# Patient Record
Sex: FEMALE | Race: WHITE | NOT HISPANIC OR LATINO | Employment: OTHER | URBAN - METROPOLITAN AREA
[De-identification: names, ages, dates, MRNs, and addresses within clinical notes are randomized per-mention and may not be internally consistent; named-entity substitution may affect disease eponyms.]

---

## 2017-02-15 NOTE — NURSING
Total Joint Replacement Questionnaire     Layla Griffiths participated in Joint Class Feb 9.    1. Have you ever had a Joint Replacement?   [x]Yes  [] No    2. How many stairs/steps do you have to enter your home? 2  When going up, on what side is the railing?  [] Left  [] Right  [] Bilateral  [x] None    3. Do you own any Durable Medical Equipment?   [x] Rolling Walker  [x] Standard Walker  [x] Cane  [] Crutches  [] Bed Side Commode  [] Hip Kit  [] Tub Transfer Bench  [x] Shower Chair    4. Have you used a Home Health Company before?   [x] Yes  [] No  If Yes, Name of Company: ? Would you like to use this company again?   [] Yes  [x] No    5. Have you arranged for someone to help you, for at least for 3 days, when you are discharged from the hospital?  [x] Yes  [] No  If Yes, Name(s) of person assisting: Saulo Griffiths and Sharon Huber  2/15/2017

## 2017-02-20 ENCOUNTER — ANESTHESIA EVENT (OUTPATIENT)
Dept: SURGERY | Facility: OTHER | Age: 72
DRG: 467 | End: 2017-02-20
Payer: MEDICARE

## 2017-02-20 ENCOUNTER — HOSPITAL ENCOUNTER (OUTPATIENT)
Dept: PREADMISSION TESTING | Facility: OTHER | Age: 72
Discharge: HOME | End: 2017-02-20
Attending: ORTHOPAEDIC SURGERY
Payer: MEDICARE

## 2017-02-20 VITALS
WEIGHT: 125 LBS | BODY MASS INDEX: 22.15 KG/M2 | HEIGHT: 63 IN | DIASTOLIC BLOOD PRESSURE: 71 MMHG | SYSTOLIC BLOOD PRESSURE: 156 MMHG | HEART RATE: 69 BPM | OXYGEN SATURATION: 98 % | TEMPERATURE: 98 F

## 2017-02-20 DIAGNOSIS — T84.498A: Primary | ICD-10-CM

## 2017-02-20 LAB
BACTERIA #/AREA URNS HPF: NORMAL /HPF
BILIRUB UR QL STRIP: NEGATIVE
CLARITY UR: CLEAR
COLOR UR: YELLOW
GLUCOSE UR QL STRIP: NEGATIVE
HGB UR QL STRIP: ABNORMAL
KETONES UR QL STRIP: NEGATIVE
LEUKOCYTE ESTERASE UR QL STRIP: NEGATIVE
MICROSCOPIC COMMENT: NORMAL
NITRITE UR QL STRIP: NEGATIVE
PH UR STRIP: 6 [PH] (ref 5–8)
PROT UR QL STRIP: NEGATIVE
RBC #/AREA URNS HPF: 3 /HPF (ref 0–4)
SP GR UR STRIP: 1.01 (ref 1–1.03)
URN SPEC COLLECT METH UR: ABNORMAL
UROBILINOGEN UR STRIP-ACNC: NEGATIVE EU/DL
WBC #/AREA URNS HPF: 2 /HPF (ref 0–5)

## 2017-02-20 PROCEDURE — 81000 URINALYSIS NONAUTO W/SCOPE: CPT

## 2017-02-20 RX ORDER — SPIRONOLACTONE AND HYDROCHLOROTHIAZIDE 25; 25 MG/1; MG/1
1 TABLET ORAL DAILY PRN
COMMUNITY

## 2017-02-20 RX ORDER — LIDOCAINE HYDROCHLORIDE 10 MG/ML
1 INJECTION, SOLUTION EPIDURAL; INFILTRATION; INTRACAUDAL; PERINEURAL ONCE
Status: CANCELLED | OUTPATIENT
Start: 2017-02-20 | End: 2017-02-20

## 2017-02-20 RX ORDER — BISACODYL 5 MG
5 TABLET, DELAYED RELEASE (ENTERIC COATED) ORAL DAILY PRN
COMMUNITY

## 2017-02-20 RX ORDER — FLUTICASONE FUROATE AND VILANTEROL 200; 25 UG/1; UG/1
1 POWDER RESPIRATORY (INHALATION) DAILY
COMMUNITY

## 2017-02-20 RX ORDER — MIDAZOLAM HYDROCHLORIDE 5 MG/ML
2 INJECTION INTRAMUSCULAR; INTRAVENOUS
Status: CANCELLED | OUTPATIENT
Start: 2017-02-20 | End: 2017-02-20

## 2017-02-20 RX ORDER — ALBUTEROL SULFATE 2.5 MG/.5ML
2.5 SOLUTION RESPIRATORY (INHALATION) EVERY 4 HOURS PRN
Status: DISCONTINUED | OUTPATIENT
Start: 2017-02-20 | End: 2017-02-21 | Stop reason: HOSPADM

## 2017-02-20 RX ORDER — PREGABALIN 75 MG/1
150 CAPSULE ORAL
Status: DISCONTINUED | OUTPATIENT
Start: 2017-02-20 | End: 2017-02-21 | Stop reason: HOSPADM

## 2017-02-20 RX ORDER — LEVOTHYROXINE SODIUM 25 UG/1
25 TABLET ORAL DAILY
COMMUNITY

## 2017-02-20 RX ORDER — SODIUM CHLORIDE, SODIUM LACTATE, POTASSIUM CHLORIDE, CALCIUM CHLORIDE 600; 310; 30; 20 MG/100ML; MG/100ML; MG/100ML; MG/100ML
INJECTION, SOLUTION INTRAVENOUS CONTINUOUS
Status: CANCELLED | OUTPATIENT
Start: 2017-02-20

## 2017-02-20 NOTE — IP AVS SNAPSHOT
[facility] Location ([facility])  [address] [place] [address]  Phone: [phone]           Patient Discharge Instructions    Our goal is to set you up for success. This packet includes information on your condition, medications, and your home care. It will help you to care for yourself so you don't get sicker.     Please ask your nurse if you have any questions.        There are many details to remember when preparing for your surgery. Here is what you will need to do, please ask your nurse if there are more specific instructions and if you have any questions:    1. 24 hours before procedure Do not smoke or drink alcoholic beverages 24 hours prior to your procedure    2. Eating before procedure Do not eat or drink anything 8 hours before your procedure - this includes gum, mints, and candy.     3. Day of procedure Please remove all jewelry for the procedure. If you wear contact lenses, dentures, hearing aids or glasses, bring a container to put them in during your surgery and give to a family member for safekeeping.  If your doctor has scheduled you for an overnight stay, bring a small overnight bag with any personal items that you need.    4. After procedure Make arrangements in advance for transportation home by a responsible adult. It is not safe to drive a vehicle during the 24 hours following surgery.     PLEASE NOTE: You may be contacted the day before your surgery to confirm your surgery date and arrival time. The Surgery schedule has many variables which may affect the time of your surgery case. Family members should be available if your surgery time changes.                ** Verify the list of medication(s) below is accurate and up to date. Carry this with you in case of emergency. If your medications have changed, please notify your healthcare provider.             Medication List      TAKE these medications        Additional Info                      amlodipine 2.5 MG tablet    Commonly known as:  NORVASC   Refills:  3    Instructions:  TK 1 T PO  QHS     Begin Date    AM    Noon    PM    Bedtime       aspirin 81 MG EC tablet   Commonly known as:  ECOTRIN   Refills:  0   Dose:  81 mg    Instructions:  Take 81 mg by mouth once daily.     Begin Date    AM    Noon    PM    Bedtime       BIOTIN ORAL   Refills:  0    Instructions:  Take by mouth.     Begin Date    AM    Noon    PM    Bedtime       bisacodyl 5 mg EC tablet   Commonly known as:  DULCOLAX   Refills:  0   Dose:  5 mg    Instructions:  Take 5 mg by mouth daily as needed for Constipation.     Begin Date    AM    Noon    PM    Bedtime       BREO ELLIPTA 200-25 mcg/dose Dsdv diskus inhaler   Refills:  0   Dose:  1 puff   Generic drug:  fluticasone-vilanterol    Instructions:  Inhale 1 puff into the lungs once daily. Controller     Begin Date    AM    Noon    PM    Bedtime       calcium citrate 1,000 mg Tab   Refills:  0   Dose:  1 tablet    Instructions:  Take 1 tablet by mouth once daily.     Begin Date    AM    Noon    PM    Bedtime       CENTRUM SILVER ORAL   Refills:  0    Instructions:  Take by mouth.     Begin Date    AM    Noon    PM    Bedtime       colesevelam 625 mg tablet   Commonly known as:  WELCHOL   Refills:  0   Dose:  1875 mg    Instructions:  Take 1,875 mg by mouth 2 (two) times daily with meals.     Begin Date    AM    Noon    PM    Bedtime       DECARA 50,000 unit capsule   Refills:  4   Generic drug:  cholecalciferol (vitamin D3)    Instructions:  TK 1 C PO Q WEEK     Begin Date    AM    Noon    PM    Bedtime       Lactobacillus rhamnosus GG 10 billion cell capsule   Commonly known as:  CULTURELLE   Refills:  0   Dose:  1 capsule    Instructions:  Take 1 capsule by mouth once daily.     Begin Date    AM    Noon    PM    Bedtime       levothyroxine 25 MCG tablet   Commonly known as:  SYNTHROID   Refills:  0   Dose:  25 mcg    Instructions:  Take 25 mcg by mouth once daily.     Begin Date    AM    Noon    PM     Bedtime       metoprolol tartrate 50 MG tablet   Commonly known as:  LOPRESSOR   Refills:  0   Dose:  1 tablet    Instructions:  Take 1 tablet by mouth Twice daily.     Begin Date    AM    Noon    PM    Bedtime       PROAIR HFA 90 mcg/actuation inhaler   Refills:  0   Generic drug:  albuterol    Instructions:  INL 1 PUFF PO Q 6 H PRN     Begin Date    AM    Noon    PM    Bedtime       ranitidine 150 MG tablet   Commonly known as:  ZANTAC   Refills:  0   Dose:  150 mg    Instructions:  Take 150 mg by mouth 2 (two) times daily.     Begin Date    AM    Noon    PM    Bedtime       spironolactone-hydrochlorothiazide 25-25mg 25-25 mg Tab   Commonly known as:  ALDACTAZIDE   Refills:  0   Dose:  1 tablet    Instructions:  Take 1 tablet by mouth daily as needed (as needed at bedtime).     Begin Date    AM    Noon    PM    Bedtime       telmisartan 80 MG Tab   Commonly known as:  MICARDIS   Refills:  3    Instructions:  TK 1 T PO QD     Begin Date    AM    Noon    PM    Bedtime       travoprost (benzalkonium) 0.004 % ophthalmic solution   Commonly known as:  TRAVATAN   Refills:  0   Dose:  1 drop    Instructions:  Place 1 drop into both eyes every evening.     Begin Date    AM    Noon    PM    Bedtime       VOLTAREN 1 % Gel   Refills:  0   Dose:  2 g   Generic drug:  diclofenac sodium    Instructions:  Apply 2 g topically once daily.     Begin Date    AM    Noon    PM    Bedtime                  Please bring to all follow up appointments:    1. A copy of your discharge instructions.  2. All medicines you are currently taking in their original bottles.  3. Identification and insurance card.    Please arrive 15 minutes ahead of scheduled appointment time.    Please call 24 hours in advance if you must reschedule your appointment and/or time.        Your Future Surgeries/Procedures     Mar 02, 2017   Surgery with David Bhandari MD   [facility] ([facility])    [address]  [place]  [address]   [phone]                  Discharge Instructions       PRE-ADMIT TESTING -  [phone]    [address]  [facility]        OUTPATIENT SURGERY UNIT - [phone]    Your surgery has been scheduled at [facility]. We are pleased to have the opportunity to serve you. For Further Information please call [phone].    On the day of surgery please report to the Information Desk on the 1st floor.    CONTACT YOUR PHYSICIAN'S OFFICE THE DAY PRIOR TO YOUR SURGERY TO OBTAIN YOUR ARRIVAL TIME.     The evening before surgery do not eat anything after 9 p.m. ( this includes hard candy, chewing gum and mints).  You may have GATORADE, POWERADE AND WATER FROM 9 p.m. until leaving home to come to the hospital.   DO NOT DRINK ANY LIQUIDS ON THE WAY TO THE HOSPITAL.     SPECIAL MEDICATION INSTRUCTIONS: TAKE medications checked off by the Anesthesiologist on your Medication List.    Angiogram Patients: Take medications as instructed by your physician, including aspirin.     Surgery Patients:    If you take ASPIRIN - Your PHYSICIAN/SURGEON will need to inform you IF/OR when you need to stop taking aspirin prior to your surgery.     Do Not take any medications containing IBUPROFEN.  Do Not Wear any make-up or dark nail polish   (especially eye make-up) to surgery. If you come to surgery with makeup on you will be required to remove the makeup or nail polish.    Do not shave your surgical area at least 5 days prior to your surgery. The surgical prep will be performed at the hospital according to Infection Control regulations.    Leave all valuables at home.   Do Not wear any jewelry or watches, including any metal in body piercings.  Contact Lens must be removed before surgery. Either do not wear the contact lens or bring a case and solution for storage.  Please bring a container for eyeglasses or dentures as required.  Bring any paperwork your physician has provided, such as consent  "forms,  history and physicals, doctor's orders, etc.   Bring comfortable clothes that are loose fitting to wear upon discharge. Take into consideration the type of surgery being performed.  Maintain your diet as advised per your physician the day prior to surgery.      Adequate rest the night before surgery is advised.   Park in the Parking lot behind the hospital or in the [facility] across the street from the parking lot. Parking is complimentary.  If you will be discharged the same day as your procedure, please arrange for a responsible adult to drive you home or to accompany you if traveling by taxi.   YOU WILL NOT BE PERMITTED TO DRIVE OR TO LEAVE THE HOSPITAL ALONE AFTER SURGERY.   It is strongly recommended that you arrange for someone to remain with you for the first 24 hrs following your surgery.       Thank you for your cooperation.  The Staff of [facility].        Bathing Instructions                                                                 Please shower the evening before and morning of your procedure with    ANTIBACTERIAL SOAP. ( DIAL, etc )  Concentrate on the surgical area   for at least 3 minutes and rinse completely. Dry off as usual.   Do not use any deodorant, powder, body lotions, perfume, after shave or    cologne.                                                Admission Information     Date & Time Provider Department CSN    2/20/2017  1:30 PM David Bhandari MD [facility] [id number]      Care Providers     Provider Role Specialty Primary office phone    David Bhandari MD Attending Provider Orthopedic Surgery [phone]      Your Vitals Were     BP Pulse Temp Height Weight       156/71 69 97.9 °F (36.6 °C) (Oral) 5' 3" (1.6 m) 56.7 kg (125 lb)     SpO2 BMI             98% 22.14 kg/m2         Recent Lab Values     No lab values to display.      Allergies as of 2/20/2017        Reactions    Percocet [Oxycodone-acetaminophen] " Palpitations    Codeine Other (See Comments)    Iodine Hives    Keflex  [Cephalexin] Itching    Methocarbamol Other (See Comments)    weakness    Diazepam Anxiety    Demerol  [Meperidine] Palpitations    Sulfa (Sulfonamide Antibiotics) Rash      [facility]     [facility] Nurse Care Line - 24/7 Assistance  Unless otherwise directed by your provider, please contact [facility], our nurse care line that is available for 24/7 assistance.     Registered nurses in the [facility] provide clinical advisement, health education, appointment booking, and other advisory services.  Call for this free service at [phone].        Advance Directives     An advance directive is a document which, in the event you are no longer able to make decisions for yourself, tells your healthcare team what kind of treatment you do or do not want to receive, or who you would like to make those decisions for you.  If you do not currently have an advance directive, [facility] encourages you to create one.  For more information call:  [phone]), [phone] ([phone]),  or log on to [email or web address].        Smoking Cessation     If you would like to quit smoking:   You may be eligible for free services if you are a [place] resident and started smoking cigarettes before September 1, 1988.  Call the Smoking Cessation Trust ([facility]) toll free at (735) 065-3542 or (777) 763-3595.   Call 8-213-QUIT-NOW if you do not meet the above criteria.            Language Assistance Services     ATTENTION: Language assistance services are available, free of charge. Please call [phone].      ATENCIÓN: Si habla español, tiene a villalpando disposición servicios gratuitos de asistencia lingüística. Llame al [phone].     CHÚ Ý: N?u b?n nói Ti?ng Vi?t, có các d?ch v? h? tr? ngôn ng? mi?n phí dành cho b?n. G?i s? [phone].        [facility] Sign-Up     Activating your [facility] account is as easy as  1-2-3!     1) Visit [email or web address], select Sign Up Now, enter this activation code and your date of birth, then select Next.  [email or web address]  Expires: 4/6/2017  2:17 PM      2) Create a username and password to use when you visit [facility] in the future and select a security question in case you lose your password and select Next.    3) Enter your e-mail address and click Sign Up!    Additional Information  If you have questions, please e-mail [email or web address] or call [phone] to talk to our [employer] staff. Remember, [facility] is NOT to be used for urgent needs. For medical emergencies, dial 911.          [facility] complies with applicable Federal civil rights laws and does not discriminate on the basis of race, color, national origin, age, disability, or sex.

## 2017-02-20 NOTE — PLAN OF CARE
02/20/17 1518   ED Admissions Case Approval   ED Admissions Case Approval (!) CM Approved  (IP )   IP CODE [id number]

## 2017-02-20 NOTE — DISCHARGE INSTRUCTIONS
PRE-ADMIT TESTING -  [phone]    [address]  [facility]        OUTPATIENT SURGERY UNIT - [phone]    Your surgery has been scheduled at [facility]. We are pleased to have the opportunity to serve you. For Further Information please call [phone].    On the day of surgery please report to the Information Desk on the 1st floor.    CONTACT YOUR PHYSICIAN'S OFFICE THE DAY PRIOR TO YOUR SURGERY TO OBTAIN YOUR ARRIVAL TIME.     The evening before surgery do not eat anything after 9 p.m. ( this includes hard candy, chewing gum and mints).  You may have GATORADE, POWERADE AND WATER FROM 9 p.m. until leaving home to come to the hospital.   DO NOT DRINK ANY LIQUIDS ON THE WAY TO THE HOSPITAL.     SPECIAL MEDICATION INSTRUCTIONS: TAKE medications checked off by the Anesthesiologist on your Medication List.    Angiogram Patients: Take medications as instructed by your physician, including aspirin.     Surgery Patients:    If you take ASPIRIN - Your PHYSICIAN/SURGEON will need to inform you IF/OR when you need to stop taking aspirin prior to your surgery.     Do Not take any medications containing IBUPROFEN.  Do Not Wear any make-up or dark nail polish   (especially eye make-up) to surgery. If you come to surgery with makeup on you will be required to remove the makeup or nail polish.    Do not shave your surgical area at least 5 days prior to your surgery. The surgical prep will be performed at the hospital according to Infection Control regulations.    Leave all valuables at home.   Do Not wear any jewelry or watches, including any metal in body piercings.  Contact Lens must be removed before surgery. Either do not wear the contact lens or bring a case and solution for storage.  Please bring a container for eyeglasses or dentures as required.  Bring any paperwork your physician has provided, such as consent forms,  history and physicals, doctor's orders, etc.   Bring comfortable  clothes that are loose fitting to wear upon discharge. Take into consideration the type of surgery being performed.  Maintain your diet as advised per your physician the day prior to surgery.      Adequate rest the night before surgery is advised.   Park in the Parking lot behind the hospital or in the [facility] across the street from the parking lot. Parking is complimentary.  If you will be discharged the same day as your procedure, please arrange for a responsible adult to drive you home or to accompany you if traveling by taxi.   YOU WILL NOT BE PERMITTED TO DRIVE OR TO LEAVE THE HOSPITAL ALONE AFTER SURGERY.   It is strongly recommended that you arrange for someone to remain with you for the first 24 hrs following your surgery.       Thank you for your cooperation.  The Staff of [facility].        Bathing Instructions                                                                 Please shower the evening before and morning of your procedure with    ANTIBACTERIAL SOAP. ( DIAL, etc )  Concentrate on the surgical area   for at least 3 minutes and rinse completely. Dry off as usual.   Do not use any deodorant, powder, body lotions, perfume, after shave or    cologne.

## 2017-02-20 NOTE — ANESTHESIA PREPROCEDURE EVALUATION
"                                                                                                             02/20/2017  Layla Griffiths is a 71 y.o., female.    [facility] Anesthesia Evaluation    I have reviewed the Patient Summary Reports.    I have reviewed the Nursing Notes.   I have reviewed the Medications.     Review of Systems  Anesthesia Hx:  No problems with previous Anesthesia  History of prior surgery of interest to airway management or planning: Previous anesthesia: General Scoliosis surg 5 yrs ago with general anesthesia.  Denies Family Hx of Anesthesia complications.   Denies Personal Hx of Anesthesia complications.   Social:  Non-Smoker, No Alcohol Use    Hematology/Oncology:  Hematology Normal   Oncology Normal     Cardiovascular:   Exercise tolerance: good Hypertension, well controlled CAD  Dysrhythmias  70% blockage in vessel "back" of heart.  Hx of rapid heart beat in past, denies afib awaiting cardiac clearance   Pulmonary:  Pulmonary Normal    Renal/:  Renal/ Normal  Microscopic hematuria   Hepatic/GI:   GERD, well controlled    Musculoskeletal:   S/p lumbar laminectomy x 2  Scoliosis surgery 2012  Cervical spine    Now pain free   Neurological:  Neurology Normal    Endocrine:  Endocrine Normal    Dermatological:  Skin Normal    Psych:  Psychiatric Normal           Physical Exam  General:  Well nourished      Dental:  Dental Findings: Lower Dentures             Anesthesia Plan  Type of Anesthesia, risks & benefits discussed:  Anesthesia Type:  general  Patient's Preference:   Intra-op Monitoring Plan:   Intra-op Monitoring Plan Comments:   Post Op Pain Control Plan:   Post Op Pain Control Plan Comments:   Induction:    Beta Blocker:         Informed Consent: Patient understands risks and agrees with Anesthesia plan.  Questions answered. Anesthesia consent signed with patient.  ASA Score: 2     Day of Surgery Review of History & Physical:    H&P update referred to the surgeon.     Anesthesia " Plan Notes: Many prev back surg for scoliosis,plan Ga no SAB,awaiting cardiac clearance        Ready For Surgery From Anesthesia Perspective.

## 2017-03-02 ENCOUNTER — HOSPITAL ENCOUNTER (INPATIENT)
Facility: OTHER | Age: 72
LOS: 1 days | Discharge: HOME HEALTH | DRG: 467 | End: 2017-03-03
Attending: ORTHOPAEDIC SURGERY | Admitting: ORTHOPAEDIC SURGERY
Payer: MEDICARE

## 2017-03-02 ENCOUNTER — ANESTHESIA (OUTPATIENT)
Dept: SURGERY | Facility: OTHER | Age: 72
DRG: 467 | End: 2017-03-02
Payer: MEDICARE

## 2017-03-02 DIAGNOSIS — T84.498A: Primary | ICD-10-CM

## 2017-03-02 PROCEDURE — 36000711: Performed by: ORTHOPAEDIC SURGERY

## 2017-03-02 PROCEDURE — 97116 GAIT TRAINING THERAPY: CPT

## 2017-03-02 PROCEDURE — 97162 PT EVAL MOD COMPLEX 30 MIN: CPT

## 2017-03-02 PROCEDURE — 25000003 PHARM REV CODE 250: Performed by: NURSE ANESTHETIST, CERTIFIED REGISTERED

## 2017-03-02 PROCEDURE — 87075 CULTR BACTERIA EXCEPT BLOOD: CPT

## 2017-03-02 PROCEDURE — C1776 JOINT DEVICE (IMPLANTABLE): HCPCS | Performed by: ORTHOPAEDIC SURGERY

## 2017-03-02 PROCEDURE — 71000033 HC RECOVERY, INTIAL HOUR: Performed by: ORTHOPAEDIC SURGERY

## 2017-03-02 PROCEDURE — 25000003 PHARM REV CODE 250: Performed by: INTERNAL MEDICINE

## 2017-03-02 PROCEDURE — 63600175 PHARM REV CODE 636 W HCPCS: Performed by: SPECIALIST

## 2017-03-02 PROCEDURE — 25000003 PHARM REV CODE 250: Performed by: ORTHOPAEDIC SURGERY

## 2017-03-02 PROCEDURE — 94640 AIRWAY INHALATION TREATMENT: CPT

## 2017-03-02 PROCEDURE — 0SR904Z REPLACEMENT OF RIGHT HIP JOINT WITH CERAMIC ON POLYETHYLENE SYNTHETIC SUBSTITUTE, OPEN APPROACH: ICD-10-PCS | Performed by: ORTHOPAEDIC SURGERY

## 2017-03-02 PROCEDURE — 11000001 HC ACUTE MED/SURG PRIVATE ROOM

## 2017-03-02 PROCEDURE — 88300 SURGICAL PATH GROSS: CPT | Mod: 26,,, | Performed by: PATHOLOGY

## 2017-03-02 PROCEDURE — 25000242 PHARM REV CODE 250 ALT 637 W/ HCPCS: Performed by: ORTHOPAEDIC SURGERY

## 2017-03-02 PROCEDURE — 97110 THERAPEUTIC EXERCISES: CPT

## 2017-03-02 PROCEDURE — 27000221 HC OXYGEN, UP TO 24 HOURS

## 2017-03-02 PROCEDURE — 94799 UNLISTED PULMONARY SVC/PX: CPT

## 2017-03-02 PROCEDURE — 63600175 PHARM REV CODE 636 W HCPCS: Performed by: NURSE ANESTHETIST, CERTIFIED REGISTERED

## 2017-03-02 PROCEDURE — 37000009 HC ANESTHESIA EA ADD 15 MINS: Performed by: ORTHOPAEDIC SURGERY

## 2017-03-02 PROCEDURE — 37000008 HC ANESTHESIA 1ST 15 MINUTES: Performed by: ORTHOPAEDIC SURGERY

## 2017-03-02 PROCEDURE — 87070 CULTURE OTHR SPECIMN AEROBIC: CPT

## 2017-03-02 PROCEDURE — 0SP90JZ REMOVAL OF SYNTHETIC SUBSTITUTE FROM RIGHT HIP JOINT, OPEN APPROACH: ICD-10-PCS | Performed by: ORTHOPAEDIC SURGERY

## 2017-03-02 PROCEDURE — 25000003 PHARM REV CODE 250: Performed by: ANESTHESIOLOGY

## 2017-03-02 PROCEDURE — 0SP909Z REMOVAL OF LINER FROM RIGHT HIP JOINT, OPEN APPROACH: ICD-10-PCS | Performed by: ORTHOPAEDIC SURGERY

## 2017-03-02 PROCEDURE — 36000710: Performed by: ORTHOPAEDIC SURGERY

## 2017-03-02 PROCEDURE — 0SU909Z SUPPLEMENT RIGHT HIP JOINT WITH LINER, OPEN APPROACH: ICD-10-PCS | Performed by: ORTHOPAEDIC SURGERY

## 2017-03-02 PROCEDURE — 71000039 HC RECOVERY, EACH ADD'L HOUR: Performed by: ORTHOPAEDIC SURGERY

## 2017-03-02 PROCEDURE — 88300 SURGICAL PATH GROSS: CPT | Performed by: PATHOLOGY

## 2017-03-02 PROCEDURE — 97530 THERAPEUTIC ACTIVITIES: CPT

## 2017-03-02 DEVICE — IMPLANTABLE DEVICE: Type: IMPLANTABLE DEVICE | Site: HIP | Status: FUNCTIONAL

## 2017-03-02 RX ORDER — SODIUM CHLORIDE 0.9 % (FLUSH) 0.9 %
3 SYRINGE (ML) INJECTION
Status: DISCONTINUED | OUTPATIENT
Start: 2017-03-02 | End: 2017-03-03 | Stop reason: HOSPADM

## 2017-03-02 RX ORDER — ONDANSETRON 2 MG/ML
4 INJECTION INTRAMUSCULAR; INTRAVENOUS ONCE AS NEEDED
Status: DISCONTINUED | OUTPATIENT
Start: 2017-03-02 | End: 2017-03-02 | Stop reason: HOSPADM

## 2017-03-02 RX ORDER — ONDANSETRON 2 MG/ML
INJECTION INTRAMUSCULAR; INTRAVENOUS
Status: DISCONTINUED | OUTPATIENT
Start: 2017-03-02 | End: 2017-03-02

## 2017-03-02 RX ORDER — DIPHENHYDRAMINE HYDROCHLORIDE 50 MG/ML
25 INJECTION INTRAMUSCULAR; INTRAVENOUS EVERY 6 HOURS PRN
Status: DISCONTINUED | OUTPATIENT
Start: 2017-03-02 | End: 2017-03-02 | Stop reason: HOSPADM

## 2017-03-02 RX ORDER — ONDANSETRON 8 MG/1
8 TABLET, ORALLY DISINTEGRATING ORAL EVERY 8 HOURS PRN
Status: DISCONTINUED | OUTPATIENT
Start: 2017-03-02 | End: 2017-03-03 | Stop reason: HOSPADM

## 2017-03-02 RX ORDER — FAMOTIDINE 20 MG/1
20 TABLET, FILM COATED ORAL 2 TIMES DAILY
Status: DISCONTINUED | OUTPATIENT
Start: 2017-03-02 | End: 2017-03-03 | Stop reason: HOSPADM

## 2017-03-02 RX ORDER — ALBUTEROL SULFATE 2.5 MG/.5ML
2.5 SOLUTION RESPIRATORY (INHALATION)
Status: DISCONTINUED | OUTPATIENT
Start: 2017-03-02 | End: 2017-03-02

## 2017-03-02 RX ORDER — LIDOCAINE HYDROCHLORIDE 10 MG/ML
1 INJECTION, SOLUTION EPIDURAL; INFILTRATION; INTRACAUDAL; PERINEURAL ONCE
Status: DISCONTINUED | OUTPATIENT
Start: 2017-03-02 | End: 2017-03-02 | Stop reason: HOSPADM

## 2017-03-02 RX ORDER — FLUTICASONE FUROATE AND VILANTEROL 200; 25 UG/1; UG/1
1 POWDER RESPIRATORY (INHALATION) DAILY
Status: DISCONTINUED | OUTPATIENT
Start: 2017-03-02 | End: 2017-03-02 | Stop reason: SDUPTHER

## 2017-03-02 RX ORDER — MORPHINE SULFATE 2 MG/ML
2 INJECTION, SOLUTION INTRAMUSCULAR; INTRAVENOUS
Status: DISCONTINUED | OUTPATIENT
Start: 2017-03-02 | End: 2017-03-02

## 2017-03-02 RX ORDER — MIDAZOLAM HYDROCHLORIDE 5 MG/ML
2 INJECTION INTRAMUSCULAR; INTRAVENOUS
Status: DISCONTINUED | OUTPATIENT
Start: 2017-03-02 | End: 2017-03-02 | Stop reason: HOSPADM

## 2017-03-02 RX ORDER — TRAVOPROST OPHTHALMIC SOLUTION 0.04 MG/ML
1 SOLUTION OPHTHALMIC NIGHTLY
Status: DISCONTINUED | OUTPATIENT
Start: 2017-03-02 | End: 2017-03-03 | Stop reason: HOSPADM

## 2017-03-02 RX ORDER — FLUTICASONE FUROATE AND VILANTEROL 200; 25 UG/1; UG/1
1 POWDER RESPIRATORY (INHALATION) DAILY
Status: DISCONTINUED | OUTPATIENT
Start: 2017-03-02 | End: 2017-03-03 | Stop reason: HOSPADM

## 2017-03-02 RX ORDER — CLINDAMYCIN PHOSPHATE 600 MG/50ML
600 INJECTION, SOLUTION INTRAVENOUS
Status: COMPLETED | OUTPATIENT
Start: 2017-03-02 | End: 2017-03-02

## 2017-03-02 RX ORDER — SODIUM CHLORIDE, SODIUM LACTATE, POTASSIUM CHLORIDE, CALCIUM CHLORIDE 600; 310; 30; 20 MG/100ML; MG/100ML; MG/100ML; MG/100ML
INJECTION, SOLUTION INTRAVENOUS CONTINUOUS
Status: DISCONTINUED | OUTPATIENT
Start: 2017-03-02 | End: 2017-03-03

## 2017-03-02 RX ORDER — MUPIROCIN 20 MG/G
1 OINTMENT TOPICAL 2 TIMES DAILY
Status: DISCONTINUED | OUTPATIENT
Start: 2017-03-02 | End: 2017-03-03 | Stop reason: HOSPADM

## 2017-03-02 RX ORDER — BUPIVACAINE HYDROCHLORIDE AND EPINEPHRINE 2.5; 5 MG/ML; UG/ML
INJECTION, SOLUTION EPIDURAL; INFILTRATION; INTRACAUDAL; PERINEURAL
Status: DISCONTINUED | OUTPATIENT
Start: 2017-03-02 | End: 2017-03-02 | Stop reason: HOSPADM

## 2017-03-02 RX ORDER — HYDROCODONE BITARTRATE AND ACETAMINOPHEN 5; 325 MG/1; MG/1
1 TABLET ORAL EVERY 4 HOURS PRN
Status: DISCONTINUED | OUTPATIENT
Start: 2017-03-02 | End: 2017-03-03 | Stop reason: HOSPADM

## 2017-03-02 RX ORDER — TRANEXAMIC ACID 100 MG/ML
INJECTION, SOLUTION INTRAVENOUS
Status: DISCONTINUED | OUTPATIENT
Start: 2017-03-02 | End: 2017-03-02 | Stop reason: HOSPADM

## 2017-03-02 RX ORDER — PROPOFOL 10 MG/ML
VIAL (ML) INTRAVENOUS
Status: DISCONTINUED | OUTPATIENT
Start: 2017-03-02 | End: 2017-03-02

## 2017-03-02 RX ORDER — FENTANYL CITRATE 50 UG/ML
INJECTION, SOLUTION INTRAMUSCULAR; INTRAVENOUS
Status: DISCONTINUED | OUTPATIENT
Start: 2017-03-02 | End: 2017-03-02

## 2017-03-02 RX ORDER — ALBUTEROL SULFATE 2.5 MG/.5ML
2.5 SOLUTION RESPIRATORY (INHALATION) EVERY 4 HOURS PRN
Status: DISCONTINUED | OUTPATIENT
Start: 2017-03-02 | End: 2017-03-02

## 2017-03-02 RX ORDER — CELECOXIB 200 MG/1
200 CAPSULE ORAL 2 TIMES DAILY
Status: DISCONTINUED | OUTPATIENT
Start: 2017-03-02 | End: 2017-03-03 | Stop reason: HOSPADM

## 2017-03-02 RX ORDER — METOPROLOL TARTRATE 50 MG/1
50 TABLET ORAL DAILY
Status: DISCONTINUED | OUTPATIENT
Start: 2017-03-03 | End: 2017-03-03 | Stop reason: HOSPADM

## 2017-03-02 RX ORDER — HYDROCODONE BITARTRATE AND ACETAMINOPHEN 10; 325 MG/1; MG/1
1 TABLET ORAL EVERY 4 HOURS PRN
Status: DISCONTINUED | OUTPATIENT
Start: 2017-03-02 | End: 2017-03-03 | Stop reason: HOSPADM

## 2017-03-02 RX ORDER — ALBUTEROL SULFATE 2.5 MG/.5ML
2.5 SOLUTION RESPIRATORY (INHALATION)
Status: COMPLETED | OUTPATIENT
Start: 2017-03-02 | End: 2017-03-02

## 2017-03-02 RX ORDER — FENTANYL CITRATE 50 UG/ML
25 INJECTION, SOLUTION INTRAMUSCULAR; INTRAVENOUS EVERY 5 MIN PRN
Status: DISCONTINUED | OUTPATIENT
Start: 2017-03-02 | End: 2017-03-02 | Stop reason: HOSPADM

## 2017-03-02 RX ORDER — SODIUM CHLORIDE 0.9 % (FLUSH) 0.9 %
3 SYRINGE (ML) INJECTION EVERY 8 HOURS
Status: DISCONTINUED | OUTPATIENT
Start: 2017-03-02 | End: 2017-03-02 | Stop reason: HOSPADM

## 2017-03-02 RX ORDER — CEFAZOLIN SODIUM 2 G/50ML
2 SOLUTION INTRAVENOUS
Status: DISCONTINUED | OUTPATIENT
Start: 2017-03-02 | End: 2017-03-02

## 2017-03-02 RX ORDER — POLYETHYLENE GLYCOL 3350 17 G/17G
17 POWDER, FOR SOLUTION ORAL DAILY
Status: DISCONTINUED | OUTPATIENT
Start: 2017-03-02 | End: 2017-03-03 | Stop reason: HOSPADM

## 2017-03-02 RX ORDER — TELMISARTAN 40 MG/1
80 TABLET ORAL DAILY
Status: DISCONTINUED | OUTPATIENT
Start: 2017-03-03 | End: 2017-03-03 | Stop reason: HOSPADM

## 2017-03-02 RX ORDER — AMLODIPINE BESYLATE 2.5 MG/1
2.5 TABLET ORAL DAILY
Status: DISCONTINUED | OUTPATIENT
Start: 2017-03-02 | End: 2017-03-03 | Stop reason: HOSPADM

## 2017-03-02 RX ORDER — NAPROXEN SODIUM 220 MG/1
81 TABLET, FILM COATED ORAL 2 TIMES DAILY
Status: DISCONTINUED | OUTPATIENT
Start: 2017-03-02 | End: 2017-03-03 | Stop reason: HOSPADM

## 2017-03-02 RX ORDER — SODIUM CHLORIDE, SODIUM LACTATE, POTASSIUM CHLORIDE, CALCIUM CHLORIDE 600; 310; 30; 20 MG/100ML; MG/100ML; MG/100ML; MG/100ML
INJECTION, SOLUTION INTRAVENOUS CONTINUOUS
Status: DISCONTINUED | OUTPATIENT
Start: 2017-03-02 | End: 2017-03-02

## 2017-03-02 RX ORDER — ASPIRIN 81 MG/1
81 TABLET ORAL DAILY
Status: DISCONTINUED | OUTPATIENT
Start: 2017-03-02 | End: 2017-03-02

## 2017-03-02 RX ORDER — HYDROMORPHONE HYDROCHLORIDE 2 MG/ML
0.4 INJECTION, SOLUTION INTRAMUSCULAR; INTRAVENOUS; SUBCUTANEOUS EVERY 5 MIN PRN
Status: DISCONTINUED | OUTPATIENT
Start: 2017-03-02 | End: 2017-03-02 | Stop reason: HOSPADM

## 2017-03-02 RX ORDER — CLINDAMYCIN PHOSPHATE 600 MG/50ML
600 INJECTION, SOLUTION INTRAVENOUS
Status: DISCONTINUED | OUTPATIENT
Start: 2017-03-02 | End: 2017-03-03 | Stop reason: HOSPADM

## 2017-03-02 RX ORDER — METOPROLOL TARTRATE 50 MG/1
50 TABLET ORAL ONCE
Status: COMPLETED | OUTPATIENT
Start: 2017-03-02 | End: 2017-03-02

## 2017-03-02 RX ORDER — LEVOTHYROXINE SODIUM 25 UG/1
25 TABLET ORAL DAILY
Status: DISCONTINUED | OUTPATIENT
Start: 2017-03-02 | End: 2017-03-03 | Stop reason: HOSPADM

## 2017-03-02 RX ORDER — ALBUTEROL SULFATE 90 UG/1
AEROSOL, METERED RESPIRATORY (INHALATION)
Status: DISCONTINUED | OUTPATIENT
Start: 2017-03-02 | End: 2017-03-02

## 2017-03-02 RX ORDER — BISACODYL 5 MG
5 TABLET, DELAYED RELEASE (ENTERIC COATED) ORAL DAILY PRN
Status: DISCONTINUED | OUTPATIENT
Start: 2017-03-02 | End: 2017-03-03 | Stop reason: HOSPADM

## 2017-03-02 RX ORDER — COLESEVELAM 180 1/1
1875 TABLET ORAL 2 TIMES DAILY WITH MEALS
Status: DISCONTINUED | OUTPATIENT
Start: 2017-03-02 | End: 2017-03-03 | Stop reason: HOSPADM

## 2017-03-02 RX ORDER — LIDOCAINE HCL/PF 100 MG/5ML
SYRINGE (ML) INTRAVENOUS
Status: DISCONTINUED | OUTPATIENT
Start: 2017-03-02 | End: 2017-03-02

## 2017-03-02 RX ORDER — RAMELTEON 8 MG/1
8 TABLET ORAL NIGHTLY PRN
Status: DISCONTINUED | OUTPATIENT
Start: 2017-03-02 | End: 2017-03-03 | Stop reason: HOSPADM

## 2017-03-02 RX ORDER — ACETAMINOPHEN 10 MG/ML
INJECTION, SOLUTION INTRAVENOUS
Status: DISCONTINUED | OUTPATIENT
Start: 2017-03-02 | End: 2017-03-02

## 2017-03-02 RX ADMIN — FAMOTIDINE 20 MG: 20 TABLET, FILM COATED ORAL at 12:03

## 2017-03-02 RX ADMIN — EPHEDRINE SULFATE 5 MG: 50 INJECTION INTRAMUSCULAR; INTRAVENOUS; SUBCUTANEOUS at 07:03

## 2017-03-02 RX ADMIN — FAMOTIDINE 20 MG: 20 TABLET, FILM COATED ORAL at 09:03

## 2017-03-02 RX ADMIN — HYDROMORPHONE HYDROCHLORIDE 0.4 MG: 2 INJECTION, SOLUTION INTRAMUSCULAR; INTRAVENOUS; SUBCUTANEOUS at 08:03

## 2017-03-02 RX ADMIN — SODIUM CHLORIDE, SODIUM LACTATE, POTASSIUM CHLORIDE, AND CALCIUM CHLORIDE: 600; 310; 30; 20 INJECTION, SOLUTION INTRAVENOUS at 06:03

## 2017-03-02 RX ADMIN — CELECOXIB 200 MG: 200 CAPSULE ORAL at 12:03

## 2017-03-02 RX ADMIN — LEVOTHYROXINE SODIUM 25 MCG: 25 TABLET ORAL at 12:03

## 2017-03-02 RX ADMIN — CLINDAMYCIN PHOSPHATE 600 MG: 12 INJECTION, SOLUTION INTRAVENOUS at 07:03

## 2017-03-02 RX ADMIN — ASPIRIN 81 MG CHEWABLE TABLET 81 MG: 81 TABLET CHEWABLE at 12:03

## 2017-03-02 RX ADMIN — ASPIRIN 81 MG CHEWABLE TABLET 81 MG: 81 TABLET CHEWABLE at 09:03

## 2017-03-02 RX ADMIN — PROPOFOL 120 MG: 10 INJECTION, EMULSION INTRAVENOUS at 06:03

## 2017-03-02 RX ADMIN — COLESEVELAM HYDROCHLORIDE 1875 MG: 625 TABLET, FILM COATED ORAL at 12:03

## 2017-03-02 RX ADMIN — ALBUTEROL SULFATE 2.5 MG: 2.5 SOLUTION RESPIRATORY (INHALATION) at 05:03

## 2017-03-02 RX ADMIN — CELECOXIB 200 MG: 200 CAPSULE ORAL at 09:03

## 2017-03-02 RX ADMIN — HYDROCODONE BITARTRATE AND ACETAMINOPHEN 1 TABLET: 5; 325 TABLET ORAL at 09:03

## 2017-03-02 RX ADMIN — CLINDAMYCIN IN 5 PERCENT DEXTROSE 600 MG: 12 INJECTION, SOLUTION INTRAVENOUS at 09:03

## 2017-03-02 RX ADMIN — ACETAMINOPHEN 1000 MG: 10 INJECTION, SOLUTION INTRAVENOUS at 07:03

## 2017-03-02 RX ADMIN — MUPIROCIN 1 G: 20 OINTMENT TOPICAL at 09:03

## 2017-03-02 RX ADMIN — ALBUTEROL SULFATE 5 PUFF: 90 AEROSOL, METERED RESPIRATORY (INHALATION) at 07:03

## 2017-03-02 RX ADMIN — FENTANYL CITRATE 100 MCG: 50 INJECTION, SOLUTION INTRAMUSCULAR; INTRAVENOUS at 06:03

## 2017-03-02 RX ADMIN — MUPIROCIN 1 G: 20 OINTMENT TOPICAL at 12:03

## 2017-03-02 RX ADMIN — ONDANSETRON 4 MG: 2 INJECTION INTRAMUSCULAR; INTRAVENOUS at 07:03

## 2017-03-02 RX ADMIN — HYDROCODONE BITARTRATE AND ACETAMINOPHEN 1 TABLET: 5; 325 TABLET ORAL at 12:03

## 2017-03-02 RX ADMIN — FLUTICASONE FUROATE AND VILANTEROL TRIFENATATE 1 PUFF: 200; 25 POWDER RESPIRATORY (INHALATION) at 12:03

## 2017-03-02 RX ADMIN — COLESEVELAM HYDROCHLORIDE 1875 MG: 625 TABLET, FILM COATED ORAL at 05:03

## 2017-03-02 RX ADMIN — SODIUM CHLORIDE, SODIUM LACTATE, POTASSIUM CHLORIDE, AND CALCIUM CHLORIDE: .6; .31; .03; .02 INJECTION, SOLUTION INTRAVENOUS at 12:03

## 2017-03-02 RX ADMIN — METOPROLOL TARTRATE 50 MG: 50 TABLET ORAL at 11:03

## 2017-03-02 RX ADMIN — CLINDAMYCIN IN 5 PERCENT DEXTROSE 600 MG: 12 INJECTION, SOLUTION INTRAVENOUS at 02:03

## 2017-03-02 RX ADMIN — EPHEDRINE SULFATE 10 MG: 50 INJECTION INTRAMUSCULAR; INTRAVENOUS; SUBCUTANEOUS at 07:03

## 2017-03-02 RX ADMIN — TRAVOPROST 1 DROP: 0.04 SOLUTION/ DROPS OPHTHALMIC at 09:03

## 2017-03-02 RX ADMIN — POLYETHYLENE GLYCOL 3350 17 G: 17 POWDER, FOR SOLUTION ORAL at 12:03

## 2017-03-02 RX ADMIN — LIDOCAINE HYDROCHLORIDE 50 MG: 20 INJECTION, SOLUTION INTRAVENOUS at 06:03

## 2017-03-02 RX ADMIN — HYDROCODONE BITARTRATE AND ACETAMINOPHEN 1 TABLET: 5; 325 TABLET ORAL at 05:03

## 2017-03-02 NOTE — PT/OT/SLP PROGRESS
Physical Therapy  Treatment    Layla Griffiths   MRN: [id number]   Admitting Diagnosis: Unspecified mechanical complication of internal orthopedic device, implant, and graft    PT Received On: 03/02/17  PT Start Time: 1422     PT Stop Time: 1448    PT Total Time (min): 26 min       Billable Minutes:  Gait Training8, Therapeutic Activity 10 and Therapeutic Exercise 8    Treatment Type: Treatment  PT/PTA: PT           General Precautions: Standard, fall  Orthopedic Precautions: RLE weight bearing as tolerated     Do you have any cultural, spiritual, Jew conflicts, given your current situation?: None specified    Subjective:  Communicated with RN prior to session.    Pain Rating: 3/10  Location - Side: Right  Location - Orientation: generalized  Location: thigh  Pain Addressed: Nurse notified, Pre-medicate for activity  Pain Rating Post-Intervention: 3/10    Objective:   Patient found with: peripheral IV, mayfield catheter    Functional Mobility:  Bed Mobility:   Supine to Sit: Moderate Assistance (with HOB elevated; pt required increased time to perform and verbal cues for safety)    Transfers:  Sit <> Stand Assistance: Minimum Assistance (x 1 from EOB; pt required multiple attempts and verbal cues for hand placement and safety)  Sit <> Stand Assistive Device: Rolling Walker    Gait:   Gait Distance: x 5 small steps from EOB to bedside chair; pt required verbal cues for sequencing, increased step length, and safety  Assistance 1: Contact Guard Assistance  Gait Assistive Device: Rolling walker  Gait Pattern: reciprocal  Gait Deviation(s): decreased marielena, increased time in double stance, decreased velocity of limb motion, decreased step length, decreased stride length, decreased weight-shifting ability, decreased toe-to-floor clearance, decreased swing-to-stance ratio, forward lean    Balance:   Static Sit: FAIR+: Able to take MINIMAL challenges from all directions  Dynamic Sit: FAIR+: Maintains balance through  "MINIMAL excursions of active trunk motion  Static Stand: FAIR: Maintains without assist but unable to take challenges  Dynamic stand: FAIR: Needs CONTACT GUARD during gait     Therapeutic Activities and Exercises:  Pt performed 1 set of 10 reps of BLE  1. Glut sets  2. Quad sets  3. Ankle pumps  Pt required verbal cues, tactile cues and visual cues for technique in order to complete.      AM-PAC 6 CLICK MOBILITY  How much help from another person does this patient currently need?   1 = Unable, Total/Dependent Assistance  2 = A lot, Maximum/Moderate Assistance  3 = A little, Minimum/Contact Guard/Supervision  4 = None, Modified [place]/Independent    Turning over in bed (including adjusting bedclothes, sheets and blankets)?: 3  Sitting down on and standing up from a chair with arms (e.g., wheelchair, bedside commode, etc.): 3  Moving from lying on back to sitting on the side of the bed?: 3  Moving to and from a bed to a chair (including a wheelchair)?: 3  Need to walk in hospital room?: 3  Climbing 3-5 steps with a railing?: 1  Total Score: 16    AM-PAC Raw Score CMS G-Code Modifier Level of Impairment Assistance   6 % Total / Unable   7 - 9 CM 80 - 100% Maximal Assist   10 - 14 CL 60 - 80% Moderate Assist   15 - 19 CK 40 - 60% Moderate Assist   20 - 22 CJ 20 - 40% Minimal Assist   23 CI 1-20% SBA / CGA   24 CH 0% Independent/ Mod I     Patient left up in chair with all lines intact, call button in reach and RN notified.    Assessment:  Pt continues to present with increased pain during session, despite intermittently falling asleep requiring verbal and tactile cues to open eyes. Pt with minimal OOB mobility at this time, reporting "weakness" vital signs remain WNL. Pt would benefit from continued skilled PT to maximize independence and safety with functional mobility.    Rehab identified problem list/impairments: Rehab identified problem list/impairments: weakness, gait instability, decreased lower " extremity function, decreased ROM, impaired endurance, impaired balance, decreased safety awareness, pain, orthopedic precautions, impaired self care skills, impaired functional mobilty    Rehab potential is good.    Activity tolerance: Good    Discharge recommendations: Discharge Facility/Level Of Care Needs:  (TBD pending progress)     Barriers to discharge: Barriers to Discharge: None    Equipment recommendations: Equipment Needed After Discharge: 3-in-1 commode     GOALS:   Physical Therapy Goals        Problem: Physical Therapy Goal    Goal Priority Disciplines Outcome Goal Variances Interventions   Physical Therapy Goal     PT/OT, PT Ongoing (interventions implemented as appropriate)     Description:  Goals to be met by: 3/31/17     Patient will increase functional independence with mobility by performin. Supine to sit with modified independence.   2. Sit to supine with modified independence.   3. Sit<>stand transfer with modified independence using rolling walker.   4. Gait x 150 feet with modified independence using rolling walker.   5. Ascend/descend 2 stairs with no handrails with CGA with or without AD.            PLAN:    Patient to be seen BID  to address the above listed problems via gait training, therapeutic activities, therapeutic exercises, neuromuscular re-education  Plan of Care expires: 17  Plan of Care reviewed with: patient, spouse    Meghann SMITH Damian, PT  2017

## 2017-03-02 NOTE — IP AVS SNAPSHOT
[facility] Location ([facility])  [address] [place] [address]  Phone: [phone]           Patient Discharge Instructions     Our goal is to set you up for success. This packet includes information on your condition, medications, and your home care. It will help you to care for yourself so you don't get sicker and need to go back to the hospital.     Please ask your nurse if you have any questions.        There are many details to remember when preparing to leave the hospital. Here is what you will need to do:    1. Take your medicine. If you are prescribed medications, review your Medication List in the following pages. You may have new medications to  at the pharmacy and others that you'll need to stop taking. Review the instructions for how and when to take your medications. Talk with your doctor or nurses if you are unsure of what to do.     2. Go to your follow-up appointments. Specific follow-up information is listed in the following pages. Your may be contacted by a transition nurse or clinical provider about future appointments. Be sure we have all of the phone numbers to reach you, if needed. Please contact your provider's office if you are unable to make an appointment.     3. Watch for warning signs. Your doctor or nurse will give you detailed warning signs to watch for and when to call for assistance. These instructions may also include educational information about your condition. If you experience any of warning signs to your health, call your doctor.               ** Verify the list of medication(s) below is accurate and up to date. Carry this with you in case of emergency. If your medications have changed, please notify your healthcare provider.             Medication List      START taking these medications        Additional Info                      aspirin 81 MG Chew   Refills:  0   Dose:  81 mg   Replaces:  aspirin 81 MG EC tablet    Last time this was given:  81 mg on  3/3/2017  8:10 AM   Instructions:  Take 1 tablet (81 mg total) by mouth 2 (two) times daily.     Begin Date    AM    Noon    PM    Bedtime       hydrocodone-acetaminophen 5-325mg 5-325 mg per tablet   Commonly known as:  NORCO   Quantity:  60 tablet   Refills:  0   Dose:  1 tablet    Last time this was given:  1 tablet on 3/3/2017  8:09 AM   Instructions:  Take 1 tablet by mouth every 6 (six) hours as needed.     Begin Date    AM    Noon    PM    Bedtime         CONTINUE taking these medications        Additional Info                      amlodipine 2.5 MG tablet   Commonly known as:  NORVASC   Refills:  3    Instructions:  TK 1 T PO  QHS     Begin Date    AM    Noon    PM    Bedtime       BIOTIN ORAL   Refills:  0    Instructions:  Take by mouth.     Begin Date    AM    Noon    PM    Bedtime       bisacodyl 5 mg EC tablet   Commonly known as:  DULCOLAX   Refills:  0   Dose:  5 mg    Instructions:  Take 5 mg by mouth daily as needed for Constipation.     Begin Date    AM    Noon    PM    Bedtime       BREO ELLIPTA 200-25 mcg/dose Dsdv diskus inhaler   Refills:  0   Dose:  1 puff   Generic drug:  fluticasone-vilanterol    Last time this was given:  1 puff on 3/2/2017 12:14 PM   Instructions:  Inhale 1 puff into the lungs once daily. Controller     Begin Date    AM    Noon    PM    Bedtime       calcium citrate 1,000 mg Tab   Refills:  0   Dose:  1 tablet    Instructions:  Take 1 tablet by mouth once daily.     Begin Date    AM    Noon    PM    Bedtime       CENTRUM SILVER ORAL   Refills:  0    Instructions:  Take by mouth.     Begin Date    AM    Noon    PM    Bedtime       colesevelam 625 mg tablet   Commonly known as:  WELCHOL   Refills:  0   Dose:  1875 mg    Last time this was given:  1,875 mg on 3/3/2017  8:10 AM   Instructions:  Take 1,875 mg by mouth 2 (two) times daily with meals.     Begin Date    AM    Noon    PM    Bedtime       DECARA 50,000 unit capsule   Refills:  4   Generic drug:  cholecalciferol  (vitamin D3)    Instructions:  TK 1 C PO Q WEEK     Begin Date    AM    Noon    PM    Bedtime       Lactobacillus rhamnosus GG 10 billion cell capsule   Commonly known as:  CULTURELLE   Refills:  0   Dose:  1 capsule    Instructions:  Take 1 capsule by mouth once daily.     Begin Date    AM    Noon    PM    Bedtime       levothyroxine 25 MCG tablet   Commonly known as:  SYNTHROID   Refills:  0   Dose:  25 mcg    Last time this was given:  25 mcg on 3/3/2017  9:00 AM   Instructions:  Take 25 mcg by mouth once daily.     Begin Date    AM    Noon    PM    Bedtime       metoprolol tartrate 50 MG tablet   Commonly known as:  LOPRESSOR   Refills:  0   Dose:  1 tablet    Last time this was given:  50 mg on 3/3/2017  8:09 AM   Instructions:  Take 1 tablet by mouth Twice daily.     Begin Date    AM    Noon    PM    Bedtime       ranitidine 150 MG tablet   Commonly known as:  ZANTAC   Refills:  0   Dose:  150 mg    Instructions:  Take 150 mg by mouth 2 (two) times daily.     Begin Date    AM    Noon    PM    Bedtime       spironolactone-hydrochlorothiazide 25-25mg 25-25 mg Tab   Commonly known as:  ALDACTAZIDE   Refills:  0   Dose:  1 tablet    Instructions:  Take 1 tablet by mouth daily as needed (as needed at bedtime).     Begin Date    AM    Noon    PM    Bedtime       telmisartan 80 MG Tab   Commonly known as:  MICARDIS   Refills:  3    Last time this was given:  80 mg on 3/3/2017  8:09 AM   Instructions:  TK 1 T PO QD     Begin Date    AM    Noon    PM    Bedtime       travoprost (benzalkonium) 0.004 % ophthalmic solution   Commonly known as:  TRAVATAN   Refills:  0   Dose:  1 drop    Instructions:  Place 1 drop into both eyes every evening.     Begin Date    AM    Noon    PM    Bedtime         STOP taking these medications     aspirin 81 MG EC tablet   Commonly known as:  ECOTRIN   Replaced by:  aspirin 81 MG Chew            Where to Get Your Medications      You can get these medications from any pharmacy     Bring a  "paper prescription for each of these medications     hydrocodone-acetaminophen 5-325mg 5-325 mg per tablet       You don't need a prescription for these medications     aspirin 81 MG Chew                  Please bring to all follow up appointments:    1. A copy of your discharge instructions.  2. All medicines you are currently taking in their original bottles.  3. Identification and insurance card.    Please arrive 15 minutes ahead of scheduled appointment time.    Please call 24 hours in advance if you must reschedule your appointment and/or time.        Follow-up Information     Please follow up.    Why:  AS SCHEDULED         Please follow up.    Why:  AS SCHEDULED         [facility]    Specialty:  Home Health Services    Why:  RN Eval to follow 3/4/17    Contact information:    [address] [id number] [phone]          Discharge Instructions     Future Orders    Activity as tolerated     Activity as tolerated     COMMODE FOR HOME USE     Questions:    Type:  Standard    Height:  5' 3" (1.6 m)    Weight:  56.7 kg (125 lb)    Does patient have medical equipment at home?:  bath bench    walker, standard    cane, quad    Length of need (1-99 months):  99    COMMODE FOR HOME USE     Questions:    Type:  Standard    Height:  5' 3" (1.6 m)    Weight:  56.7 kg (125 lb)    Does patient have medical equipment at home?:  rollator    bath bench    Length of need (1-99 months):  99    Diet general     Questions:    Total calories:      Fat restriction, if any:      Protein restriction, if any:      Na restriction, if any:      Fluid restriction:      Additional restrictions:      Diet general     Questions:    Total calories:      Fat restriction, if any:      Protein restriction, if any:      Na restriction, if any:      Fluid restriction:      Additional restrictions:      HIP KIT FOR HOME USE     Questions:    Height:  5' 3" (1.6 m)    Weight:  56.7 kg (125 lb)    Does " "patient have medical equipment at home?:  bath bench    walker, standard    cane, quad    Length of need (1-99 months):  99    Type:  [place] Hip Kit    HIP KIT FOR HOME USE     Questions:    Height:  5' 3" (1.6 m)    Weight:  56.7 kg (125 lb)    Does patient have medical equipment at home?:  rollator    bath bench    Length of need (1-99 months):  99    Type:  [place] Hip Kit    Sponge bath only until clinic visit     Sponge bath only until clinic visit     WALKER FOR HOME USE     Questions:    Type of Walker:  Adult (5'4"-6'6")    With wheels?:  Yes    Height:  5' 3" (1.6 m)    Weight:  56.7 kg (125 lb)    Length of need (1-99 months):  99    Platform attachment:      Accessories/Other:      Assistance needed:      Does patient have medical equipment at home?:  bath bench    walker, standard    cane, quad    Please check all that apply:  Patient's condition impairs ambulation.    Patient needs help to get in and out of chair.    Walker will be used for gait training.    WALKER FOR HOME USE     Questions:    Type of Walker:  Adult (5'4"-6'6")    With wheels?:  Yes    Height:  5' 3" (1.6 m)    Weight:  56.7 kg (125 lb)    Length of need (1-99 months):  99    Platform attachment:      Accessories/Other:      Assistance needed:      Does patient have medical equipment at home?:  rollator    bath bench    Please check all that apply:  Patient's condition impairs ambulation.    Patient is unable to safely ambulate without equipment.    Walker will be used for gait training.        Discharge Instructions          Hip Replacement Discharge Instructions    1. Pain:  a. After surgery you may feel some pain in the operative leg groin area. This is normal. Your hip will likely have been injected with a numbing medicine (Exparel) prior to completion of surgery for pain control. This is indicated on a green bracelet that you will wear for 4 days after surgery. You will also get a prescription for pain control before you " leave the hospital.  b. Elevate your leg when sitting for comfort  2. Incision Care:  a. Some drainage from the incision in the first 72 hours is normal. If drainage is excessive, remove bandage, clean with mild soap and water, pat dry, cover with sterile gauze, and secure with tape. Notify M.D. for excessive drainage.  b. Staples will be removed 14-21 days after surgery.  3. Activity:  a. See attached hip precautions and follow for 6 weeks (instructions found at the end of packet):  b. Perform exercises 3 times a day.  c. Use a hard, flat surface, such as a firm mattress, when exercising.  d. You may shower 3 days after surgery providing the dressing is waterproof. Support/help is mandatory during showering. If dressing becomes wet, replace with a new dressing. No bathing or swimming for 6 weeks or until incision is completely healed.  e. Wear louise hose for 3 weeks after surgery. You may remove for 1-2 hours during the day only.  4. Safety:  a. Add cushions to low chairs and car seats for elevation.  b. When getting in and out of a car, it is important to keep your leg straight and out to the side. Wear a seatbelt at all times.  c. You will be given a raised toilet seat (3-1 commode).  d. Use a walker, cane, or crutches as long as M.D. recommends.  5. Possible Complications: Report to Surgeon  a. Infection  i. Unexpected redness  ii. Persistent drainage  iii. Temperature greater than 101°F  iv. Additional swelling  v. Pain not controlled with current pain medicine  b. Blood clot  i. Unusual pain  ii. Red or discolored skin  iii. Swelling  iv. Unusual warm skin  c. Dislocation  i. Severe hip pain especially when moved  ii. The injured leg is shorter than the uninjured leg  iii. The injured leg lies in an abnormal position. In most cases the leg is bent at the hip, turned inward and pulled toward the middle of the body.            Primary Diagnosis     Your primary diagnosis was:  Mechanical Complication Of Internal  "Orthopedic Device, Implant, Graft      Admission Information     Date & Time Provider Department CSN    3/2/2017  5:16 AM David Bhandari MD [facility] [id number]      Care Providers     Provider Role Specialty Primary office phone    David Bhandari MD Attending Provider Orthopedic Surgery [phone]    David Bhandari MD Surgeon  Orthopedic Surgery [phone]    Brandon Rodriguez MD Consulting Physician  Nephrology [phone]      Your Vitals Were     BP Pulse Temp Resp Height Weight    144/63 (BP Location: Left arm, Patient Position: Sitting, BP Method: Automatic) 66 97.5 °F (36.4 °C) (Oral) 18 5' 3" (1.6 m) 56.7 kg (125 lb)    SpO2 BMI             93% 22.14 kg/m2         Recent Lab Values     No lab values to display.      Pending Labs     Order Current Status    Aerobic culture Preliminary result    Culture, Anaerobic Preliminary result      Allergies as of 3/3/2017        Reactions    Percocet [Oxycodone-acetaminophen] Palpitations    Adhesive Blisters    Codeine Other (See Comments)    Iodine Hives    Keflex  [Cephalexin] Itching    Methocarbamol Other (See Comments)    weakness    Diazepam Anxiety    Demerol  [Meperidine] Palpitations    Sulfa (Sulfonamide Antibiotics) Rash      [facility] On [facility]     [facility] Nurse Care Line - 24/7 Assistance  Unless otherwise directed by your provider, please contact [facility], our nurse care line that is available for 24/7 assistance.     Registered nurses in the [facility] provide clinical advisement, health education, appointment booking, and other advisory services.  Call for this free service at [phone].        Advance Directives     An advance directive is a document which, in the event you are no longer able to make decisions for yourself, tells your healthcare team what kind of treatment you do or do not want to receive, or who you would like to make those decisions for you.  If you do not currently have " an advance directive, [facility] encourages you to create one.  For more information call:  [phone]), [phone] ([phone]),  or log on to [email or web address].        Smoking Cessation     If you would like to quit smoking:   You may be eligible for free services if you are a [place] resident and started smoking cigarettes before September 1, 1988.  Call the Smoking Cessation Trust (SCT) toll free at (495) 339-1278 or (565) 628-8013.   Call 7-492-QUIT-NOW if you do not meet the above criteria.            Language Assistance Services     ATTENTION: Language assistance services are available, free of charge. Please call [phone].      ATENCIÓN: Si mary janela alex, tiene a villalpando disposición servicios gratuitos de asistencia lingüística. Llame al [phone].     CHÚ Ý: N?u b?n nói Ti?ng Vi?t, có các d?ch v? h? tr? ngôn ng? mi?n phí dành cho b?n. G?i s? [phone].        [facility] Sign-Up     Activating your [facility] account is as easy as 1-2-3!     1) Visit [email or web address], select Sign Up Now, enter this activation code and your date of birth, then select Next.  [email or web address]  Expires: 4/6/2017  2:17 PM      2) Create a username and password to use when you visit [facility] in the future and select a security question in case you lose your password and select Next.    3) Enter your e-mail address and click Sign Up!    Additional Information  If you have questions, please e-mail [email or web address] or call [phone] to talk to our [facility] staff. Remember, [facility] is NOT to be used for urgent needs. For medical emergencies, dial 911.          [facility] complies with applicable Federal civil rights laws and does not discriminate on the basis of race, color, national origin, age, disability, or sex.

## 2017-03-02 NOTE — OR NURSING
Pt states pain tolerable. No change from previous assessment. Prepared for transfer to inpt room 336.

## 2017-03-02 NOTE — H&P
CHIEF COMPLAINT AND PRESENT ILLNESS:  The patient is a 71-year-old with   instability of the right hip with pain, it pops and persistent pain with   activities.    PAST MEDICAL HISTORY:  ALLERGIC TO CODEINE, IVP DYE, PENICILLIN, SULFA, KEFLEX.    Hydrocodone does okay.    MEDICATIONS:  She is on Norvasc, metoprolol, telmisartan and spironolactone.    REVIEW OF SYSTEMS:  Otherwise, is feeling okay.  She fell about 2 days ago, saw   again in the office, took repeat x-rays and looked okay.  Fell maybe because of   the hip given out.    PHYSICAL EXAMINATION:  HEART:  Regular rate and rhythm.  LUNGS:  Clear.  EXTREMITIES:  Gets instability in that right hip with flexion and little bit   internal rotation.    ASSESSMENT:  Right hip instability.    PLAN:  Revision hopefully stabilize her hips.  Significant risks discussed,   especially with the continued problem and lengthening of that right leg.      FARZANEH/  dd: 03/02/2017 06:44:18 (CST)  td: 03/02/2017 10:40:08 (CST)  Doc ID   #[id number]  Job ID #[id number]    CC:

## 2017-03-02 NOTE — OR NURSING
Update to  Saulo Report to Carlos Alberto YANG. Pt without change from previous assessment. Asleep. Snoring.

## 2017-03-02 NOTE — PLAN OF CARE
03/02/17 0941   ED Admissions Case Approval   ED Admissions Case Approval (!) CM Approved  (IP )

## 2017-03-02 NOTE — BRIEF OP NOTE
[facility]  Brief Operative Note    SUMMARY     Surgery Date: 3/2/2017     Surgeon(s) and Role:     * David Bhandari MD - Primary    Assisting Surgeon: None    Pre-op Diagnosis:  Unspecified mechanical complication of internal orthopedic device, implant, and graft [T84.498A]    Post-op Diagnosis:  Post-Op Diagnosis Codes:     * Unspecified mechanical complication of internal orthopedic device, implant, and graft [T84.498A]    Procedure(s) (LRB):  REVISION-ARTHROPLASTY-HIP-TOTAL (Right)    Anesthesia: Choice    Description of Procedure: threv acet liner    Description of the findings of the procedure: broken liner    Estimated Blood Loss: * No values recorded between 3/2/2017  7:13 AM and 3/2/2017  8:05 AM *200         Specimens:   Specimen (12h ago through future)    Start     Ordered    03/02/17 0746  Specimen to Pathology - Surgery  Once     Comments:  1. Right hip- failed acetabular lining;  needs to  specimen (Skip- [phone])    03/02/17 2545

## 2017-03-02 NOTE — PLAN OF CARE
Patient prefers to have  Saulo spouse present for discharge teaching. Please contact them @ [phone].

## 2017-03-02 NOTE — DISCHARGE INSTRUCTIONS
Hip Replacement Discharge Instructions    1. Pain:  a. After surgery you may feel some pain in the operative leg groin area. This is normal. Your hip will likely have been injected with a numbing medicine (Exparel) prior to completion of surgery for pain control. This is indicated on a green bracelet that you will wear for 4 days after surgery. You will also get a prescription for pain control before you leave the hospital.  b. Elevate your leg when sitting for comfort  2. Incision Care:  a. Some drainage from the incision in the first 72 hours is normal. If drainage is excessive, remove bandage, clean with mild soap and water, pat dry, cover with sterile gauze, and secure with tape. Notify M.D. for excessive drainage.  b. Staples will be removed 14-21 days after surgery.  3. Activity:  a. See attached hip precautions and follow for 6 weeks (instructions found at the end of packet):  b. Perform exercises 3 times a day.  c. Use a hard, flat surface, such as a firm mattress, when exercising.  d. You may shower 3 days after surgery providing the dressing is waterproof. Support/help is mandatory during showering. If dressing becomes wet, replace with a new dressing. No bathing or swimming for 6 weeks or until incision is completely healed.  e. Wear louise hose for 3 weeks after surgery. You may remove for 1-2 hours during the day only.  4. Safety:  a. Add cushions to low chairs and car seats for elevation.  b. When getting in and out of a car, it is important to keep your leg straight and out to the side. Wear a seatbelt at all times.  c. You will be given a raised toilet seat (3-1 commode).  d. Use a walker, cane, or crutches as long as M.D. recommends.  5. Possible Complications: Report to Surgeon  a. Infection  i. Unexpected redness  ii. Persistent drainage  iii. Temperature greater than 101°F  iv. Additional swelling  v. Pain not controlled with current pain medicine  b. Blood clot  i. Unusual pain  ii. Red or  discolored skin  iii. Swelling  iv. Unusual warm skin  c. Dislocation  i. Severe hip pain especially when moved  ii. The injured leg is shorter than the uninjured leg  iii. The injured leg lies in an abnormal position. In most cases the leg is bent at the hip, turned inward and pulled toward the middle of the body.

## 2017-03-02 NOTE — INTERVAL H&P NOTE
The patient has been examined and the H&P has been reviewed:    I concur with the findings and no changes have occurred since H&P was written.    Anesthesia/Surgery risks, benefits and alternative options discussed and understood by patient/family.          Active Hospital Problems    Diagnosis  POA    *Unspecified mechanical complication of internal orthopedic device, implant, and graft [T84.317K]  Yes      Resolved Hospital Problems    Diagnosis Date Resolved POA   No resolved problems to display.

## 2017-03-02 NOTE — TRANSFER OF CARE
"Anesthesia Transfer of Care Note    Patient: Layla Griffiths    Procedure(s) Performed: Procedure(s) (LRB):  REVISION-ARTHROPLASTY-HIP-TOTAL (Right)    Patient location: PACU    Anesthesia Type: general    Transport from OR: Transported from OR on 2-3 L/min O2 by NC with adequate spontaneous ventilation    Post pain: adequate analgesia    Post assessment: no apparent anesthetic complications and tolerated procedure well    Post vital signs: stable    Level of consciousness: responds to stimulation    Nausea/Vomiting: no nausea/vomiting    Complications: none          Last vitals:   Visit Vitals    BP (!) 141/64 (BP Location: Right arm, Patient Position: Lying, BP Method: Automatic)    Pulse 69    Temp 36.5 °C (97.7 °F) (Oral)    Resp 16    Ht 5' 3" (1.6 m)    Wt 56.7 kg (125 lb)    SpO2 100%    Breastfeeding No    BMI 22.14 kg/m2     "

## 2017-03-02 NOTE — PLAN OF CARE
Problem: Physical Therapy Goal  Goal: Physical Therapy Goal  Goals to be met by: 3/31/17     Patient will increase functional independence with mobility by performin. Supine to sit with modified independence.   2. Sit to supine with modified independence.   3. Sit<>stand transfer with modified independence using rolling walker.   4. Gait x 150 feet with modified independence using rolling walker.   5. Ascend/descend 2 stairs with no handrails with CGA with or without AD.  Outcome: Ongoing (interventions implemented as appropriate)  PT evaluation completed. Please see progress note for details, POC, and recommendations.

## 2017-03-02 NOTE — PLAN OF CARE
Problem: Patient Care Overview  Goal: Plan of Care Review  Outcome: Ongoing (interventions implemented as appropriate)  Pt received on 2LNC with adequate saturation. Inhaler initiated and tolerated well. Post op incentive spirometer instruct completed, achieved 1250cc.

## 2017-03-02 NOTE — PT/OT/SLP EVAL
Physical Therapy  Evaluation and Treatment    Layla Griffiths   MRN: [id number]   Admitting Diagnosis: Unspecified mechanical complication of internal orthopedic device, implant, and graft    PT Received On: 03/02/17  PT Start Time: 1123     PT Stop Time: 1152    PT Total Time (min): 29 min       Billable Minutes:  Evaluation 19 and Therapeutic Activity 10    Diagnosis: Unspecified mechanical complication of internal orthopedic device, implant, and graft    Past Medical History:   Diagnosis Date    Anemia     Arthritis     Blood transfusion     Cancer     uterine    Cataract     Coronary artery disease     Degenerative disc disease     GERD (gastroesophageal reflux disease)     Glaucoma     Hyperlipidemia     Hypertension     Osteoporosis     Thyroid disease       Past Surgical History:   Procedure Laterality Date    ADENOIDECTOMY      APPENDECTOMY      BACK SURGERY  2006    Stenosis    BACK SURGERY      multiple procedures, cervical to sacral spine. Screws & titanium rods    BLADDER SUSPENSION  2009    BREAST LUMPECTOMY Left 1965    BREAST SURGERY Left 1966 & 1998    Lumpectomy- Both Benign    COLONOSCOPY  2014?    Dr Camp    EYE SURGERY Bilateral 2014 2014    HAND SURGERY Left 2016    HYSTERECTOMY  1969    JOINT REPLACEMENT      right hip    SPINE SURGERY  2013    Scoliosis    TONSILLECTOMY       Referring physician: Luciano  Date referred to PT: 3/2/2017    General Precautions: Standard, fall  Orthopedic Precautions: RLE weight bearing as tolerated     Do you have any cultural, spiritual, Muslim conflicts, given your current situation?: None specified    Patient History:  Living Environment Comment: Pt lives with her  in a 1 story house with 2 MIK and a tub shower in her bathroom. She reports being (I) PTA with all mobility and ADLs  Equipment Currently Used at Home: none  DME owned (not currently used): rolling walker, standard walker, single point cane, shower  chair and transfer tub bench    Previous Level of Function:  Ambulation Skills: independent  Transfer Skills: independent  ADL Skills: independent  Work/Leisure Activity: independent    Subjective:  Communicated with RN prior to session.    Chief Complaint: Increased pain  Patient goals: To return home     Pain Ratin/10   Location - Side: Right  Location - Orientation: generalized  Location: thigh  Pain Addressed: Nurse notified, Cessation of Activity, Distraction, Reposition  Pain Rating Post-Intervention: 10/10    Objective:   Patient found with: peripheral IV, mayfield catheter     Cognitive Exam:  Oriented to: Person, Place, Time and Situation    Follows Commands/attention: Follows one-step commands  Communication: clear/fluent  Safety awareness/insight to disability: intact    Physical Exam:  Postural examination/scapula alignment: Rounded shoulder and Head forward    Skin integrity: Visible skin intact  Edema: None noted     Sensation:   Intact    Lower Extremity Range of Motion:  Right Lower Extremity: WFL except hip NT  Left Lower Extremity: WFL    Lower Extremity Strength:  Right Lower Extremity: ankle DF: 4; Good quad contraction and glut contraction  Left Lower Extremity: WFL    Gross motor coordination: WFL    Functional Mobility:  Bed Mobility:  Supine to Sit: Moderate Assistance (with HOB elevated; pt required verbal cues for safety and technique and required increased time to perform)  Sit to Supine: Moderate Assistance (at RLE to prevent excessive adduction for safety)    Transfers:  Sit <> Stand Assistance:  (Unable to perform at this time)    Gait:   Gait Distance: unable to perform    Balance:   Static Sit: FAIR: Maintains without assist, but unable to take any challenges   Dynamic Sit: FAIR: Cannot move trunk without losing balance    AM-PAC 6 CLICK MOBILITY  How much help from another person does this patient currently need?   1 = Unable, Total/Dependent Assistance  2 = A lot, Maximum/Moderate  Assistance  3 = A little, Minimum/Contact Guard/Supervision  4 = None, Modified Gordon/Independent    Turning over in bed (including adjusting bedclothes, sheets and blankets)?: 3  Sitting down on and standing up from a chair with arms (e.g., wheelchair, bedside commode, etc.): 1  Moving from lying on back to sitting on the side of the bed?: 3  Moving to and from a bed to a chair (including a wheelchair)?: 1  Need to walk in hospital room?: 1  Climbing 3-5 steps with a railing?: 1  Total Score: 10     AM-PAC Raw Score CMS G-Code Modifier Level of Impairment Assistance   6 % Total / Unable   7 - 9 CM 80 - 100% Maximal Assist   10 - 14 CL 60 - 80% Moderate Assist   15 - 19 CK 40 - 60% Moderate Assist   20 - 22 CJ 20 - 40% Minimal Assist   23 CI 1-20% SBA / CGA   24 CH 0% Independent/ Mod I     Patient left supine with all lines intact, call button in reach, bed alarm on, RN notified and  present.    Assessment:   Layla Griffiths is a 71 y.o. female with a medical diagnosis of Unspecified mechanical complication of internal orthopedic device, implant, and graft and presents with s/p R NITESH revision. Pt unable to perform out of bed mobility secondary to increased pain and intermittently falling asleep throughout session. Pt would benefit from continued skilled PT to maximize independence and safety with functional mobility.    Rehab identified problem list/impairments: Rehab identified problem list/impairments: weakness, gait instability, decreased lower extremity function, decreased ROM, impaired endurance, impaired balance, decreased safety awareness, pain, orthopedic precautions, impaired self care skills, impaired functional mobilty    Rehab potential is good.    Activity tolerance: Good    Discharge recommendations: Discharge Facility/Level Of Care Needs:  (TBD pending progress)     Barriers to discharge: Barriers to Discharge: None    Equipment recommendations: Equipment Needed After  Discharge: 3-in-1 commode     GOALS:   Physical Therapy Goals        Problem: Physical Therapy Goal    Goal Priority Disciplines Outcome Goal Variances Interventions   Physical Therapy Goal     PT/OT, PT Ongoing (interventions implemented as appropriate)     Description:  Goals to be met by: 3/31/17     Patient will increase functional independence with mobility by performin. Supine to sit with modified independence.   2. Sit to supine with modified independence.   3. Sit<>stand transfer with modified independence using rolling walker.   4. Gait x 150 feet with modified independence using rolling walker.   5. Ascend/descend 2 stairs with no handrails with CGA with or without AD.            PLAN:    Patient to be seen BID to address the above listed problems via gait training, therapeutic activities, therapeutic exercises, neuromuscular re-education  Plan of Care expires: 17  Plan of Care reviewed with: patient, spouse    Meghann Salgado, PT  2017

## 2017-03-02 NOTE — OP NOTE
DATE OF PROCEDURE:  03/02/2017    CHIEF COMPLAINT AND PRESENT ILLNESS:  The patient is a 71-year-old, had a hip   replacement 4 to 5 years ago, Jp construct, and she felt clicking and pain   and instability that has been going on and on pretty much since early on.  You   could feel or reproduce it with some flexion and internal rotation, you can   feel, I think, it slipped and cracked.  So, consequently the patient elected for   revision x-ray, everything looked lined up appropriately, but again, she is   having that instability symptoms and you could actually feel it.    PREOPERATIVE DIAGNOSIS:  Failed right hip.    POSTOPERATIVE DIAGNOSIS:  Failed right acetabular liner.    PROCEDURE:  Right total hip revision, acetabular liner and put a ceramic head   on.  It is a Jp construct, put a 32 articulation.  She had a 36 and put a   32+7 neutral wall liner with a -3.5 ceramic 32 head and neck.    SURGEON:  David Bhandari M.D.    ASSISTANT:  Penelope Kraus CST.    ANESTHESIA:  General anesthesia.    COMPLICATIONS:  None.    BLOOD LOSS:  About 200 mL.    PROCEDURE IN DETAIL:  The patient was brought to the Operating Room and   underwent general intubation without difficulties, placed in the supine position   with a bump underneath the sacrum. The right hip was prepped and draped in the   usual sterile fashion.  It should be noted she was slightly short on that right   side.  Using posteriorly curved lateral incision, extension of the prior   incision, sharp dissection made down to the IT band and gluteus al fascia.    This was split in the usual fashion in a Chance approach was performed by   splitting the one-third gluteus medius and vastus lateralis off the hip.  It   should be noted it was very thin gluteus medius and little perforations in it   surprisingly, so, elevated that off.  She had some fluid in there and had a   little bit of black synovium, I knew something was not articulating ideally.     So, I took some synovitis and capsulitis out and put the acetabular retractors   in and upon putting the acetabular retractors in, you could see where the liner   was broken inferiorly and superiorly.  I did dislocation, knocked the head off   and put further acetabular retractors in and the little tooth locking device was   opened and it was supposed to be closed apparently.  So, since it was still   open, I got an osteotome underneath it and left it out, took a little bit of   doing and you could see where she had broken the line inferiorly and superiorly   at the edge.  The acetabulum was cleaned up.  I made sure all the plastic is   out.  There were some shards of plastic kind of everywhere.  I did more of a   capsulectomy and make sure there was not any debris.  Then, I want to increase   her offset little bit, so we had more stability and I did not want to use   another 36, I wanted more plastic, so with a 32+7 locked that in and the locking   device was still good, thank goodness, locked in nicely and had appropriate   stability.  With a trial on -3 showed leg lengths equal.  No toggle and   excellent range of motion and stability, so a final -3.5 ceramic was placed   since it was a  revision situation.  Irrigation was used again.  A #1 Vicryl was   used in abductor musculature and vas lateralis, #1 Vicryl in the IT band and   gluteus al fascia, #1 Vicryl in deep fatty layer, 2-0 Vicryl   subcutaneously, staples on the skin.  Xarelto, tranexamic acid and Marcaine were   instilled in the soft tissues.  She was placed in a bulky sterile dressing and   brought to Recovery in stable fashion.  Leg lengths equal.        /ls [id number] blank(s)        FARZANEH/  dd: 03/02/2017 08:09:30 (CST)  td: 03/02/2017 11:57:23 (CST)  Doc ID   #[id number]  Job ID #[id number]    CC:

## 2017-03-02 NOTE — ANESTHESIA POSTPROCEDURE EVALUATION
"Anesthesia Post Evaluation    Patient: Layla Griffiths    Procedure(s) Performed: Procedure(s) (LRB):  REVISION-ARTHROPLASTY-HIP-TOTAL (Right)    Final Anesthesia Type: general  Patient location during evaluation: PACU  Patient participation: Yes- Able to Participate  Level of consciousness: awake and alert  Post-procedure vital signs: reviewed and stable  Pain management: adequate  Airway patency: patent  PONV status at discharge: No PONV  Anesthetic complications: no      Cardiovascular status: hemodynamically stable  Respiratory status: unassisted and spontaneous ventilation  Hydration status: euvolemic  Follow-up not needed.        Visit Vitals    BP (!) 143/67    Pulse 68    Temp 36.6 °C (97.8 °F) (Oral)    Resp 16    Ht 5' 3" (1.6 m)    Wt 56.7 kg (125 lb)    SpO2 100%    Breastfeeding No    BMI 22.14 kg/m2       Pain/Sandra Score: Pain Assessment Performed: Yes (3/2/2017  5:47 AM)  Presence of Pain: complains of pain/discomfort (3/2/2017  9:11 AM)  Pain Rating Prior to Med Admin: 6 (3/2/2017  8:36 AM)  Sandra Score: 8 (3/2/2017  9:11 AM)      "

## 2017-03-02 NOTE — PLAN OF CARE
DC Planning:    Writer met with patient at bedside,  Saulo present as well. Patient and  cannot remember if the walker she owns is rolling or standard. She also has tub bench and quad cane. Writer will request DME order for RW if patient in fact does not have one.     She stated she had Home Health in 2006 but cannot remember with whom. Patient has no preference as to which Home Health agency she works with now.     Patient has no financial concerns regarding meds and healthcare.      will provide transportation as patient is not able to drive.     No other D/C needs expressed.     CM to follow and remain supportive.      03/02/17 4530   Discharge Assessment   Assessment Type Discharge Planning Assessment   Confirmed/corrected address and phone number on facesheet? Yes   Assessment information obtained from? Patient;Caregiver   Prior to hospitilization cognitive status: Alert/Oriented   Prior to hospitalization functional status: Independent;Assistive Equipment   Current cognitive status: Alert/Oriented   Current Functional Status: Independent;Assistive Equipment   Arrived From admitted as an inpatient   Lives With spouse   Able to Return to Prior Arrangements yes   Is patient able to care for self after discharge? No   How many people do you have in your home that can help with your care after discharge? 1   Who are your caregiver(s) and their phone number(s)? Saulo Morris, , [phone]   Patient currently being followed by outpatient case management? No   Patient currently receives home health services? No   Does the patient currently use HME? Yes   Patient currently receives private duty nursing? N/A   Patient currently receives any other outside agency services? No   Equipment Currently Used at Home bath bench;walker, standard;cane, quad   Do you have any problems affording any of your prescribed medications? No   Is the patient taking medications as prescribed? yes   Do you  have any financial concerns preventing you from receiving the healthcare you need? No   Does the patient have transportation to healthcare appointments? Yes   Transportation Available car   Discharge Plan A Home Health   Patient/Family In Agreement With Plan yes

## 2017-03-03 VITALS
SYSTOLIC BLOOD PRESSURE: 144 MMHG | OXYGEN SATURATION: 93 % | TEMPERATURE: 98 F | DIASTOLIC BLOOD PRESSURE: 63 MMHG | BODY MASS INDEX: 22.15 KG/M2 | WEIGHT: 125 LBS | RESPIRATION RATE: 18 BRPM | HEIGHT: 63 IN | HEART RATE: 66 BPM

## 2017-03-03 PROBLEM — T84.498A UNSPECIFIED MECHANICAL COMPLICATION OF INTERNAL ORTHOPEDIC DEVICE, IMPLANT, AND GRAFT: Status: RESOLVED | Noted: 2017-03-02 | Resolved: 2017-03-03

## 2017-03-03 LAB
ANION GAP SERPL CALC-SCNC: 5 MMOL/L
BASOPHILS # BLD AUTO: 0.01 K/UL
BASOPHILS NFR BLD: 0.2 %
BUN SERPL-MCNC: 10 MG/DL
CALCIUM SERPL-MCNC: 8.9 MG/DL
CHLORIDE SERPL-SCNC: 97 MMOL/L
CO2 SERPL-SCNC: 30 MMOL/L
CREAT SERPL-MCNC: 0.8 MG/DL
DIFFERENTIAL METHOD: ABNORMAL
EOSINOPHIL # BLD AUTO: 0.2 K/UL
EOSINOPHIL NFR BLD: 3.9 %
ERYTHROCYTE [DISTWIDTH] IN BLOOD BY AUTOMATED COUNT: 13.2 %
EST. GFR  (AFRICAN AMERICAN): >60 ML/MIN/1.73 M^2
EST. GFR  (NON AFRICAN AMERICAN): >60 ML/MIN/1.73 M^2
GLUCOSE SERPL-MCNC: 125 MG/DL
HCT VFR BLD AUTO: 36.5 %
HGB BLD-MCNC: 11.9 G/DL
LYMPHOCYTES # BLD AUTO: 1.3 K/UL
LYMPHOCYTES NFR BLD: 22.3 %
MCH RBC QN AUTO: 29.5 PG
MCHC RBC AUTO-ENTMCNC: 32.6 %
MCV RBC AUTO: 91 FL
MONOCYTES # BLD AUTO: 0.9 K/UL
MONOCYTES NFR BLD: 15.9 %
NEUTROPHILS # BLD AUTO: 3.3 K/UL
NEUTROPHILS NFR BLD: 57.5 %
PLATELET # BLD AUTO: 206 K/UL
PMV BLD AUTO: 9.7 FL
POTASSIUM SERPL-SCNC: 4.9 MMOL/L
RBC # BLD AUTO: 4.03 M/UL
SODIUM SERPL-SCNC: 132 MMOL/L
WBC # BLD AUTO: 5.65 K/UL

## 2017-03-03 PROCEDURE — 25000003 PHARM REV CODE 250: Performed by: ORTHOPAEDIC SURGERY

## 2017-03-03 PROCEDURE — 85025 COMPLETE CBC W/AUTO DIFF WBC: CPT

## 2017-03-03 PROCEDURE — 97530 THERAPEUTIC ACTIVITIES: CPT

## 2017-03-03 PROCEDURE — 25000003 PHARM REV CODE 250: Performed by: NURSE PRACTITIONER

## 2017-03-03 PROCEDURE — 97535 SELF CARE MNGMENT TRAINING: CPT

## 2017-03-03 PROCEDURE — 36415 COLL VENOUS BLD VENIPUNCTURE: CPT

## 2017-03-03 PROCEDURE — 97165 OT EVAL LOW COMPLEX 30 MIN: CPT

## 2017-03-03 PROCEDURE — 97116 GAIT TRAINING THERAPY: CPT

## 2017-03-03 PROCEDURE — 80048 BASIC METABOLIC PNL TOTAL CA: CPT

## 2017-03-03 RX ORDER — NAPROXEN SODIUM 220 MG/1
81 TABLET, FILM COATED ORAL 2 TIMES DAILY
Refills: 0 | COMMUNITY
Start: 2017-03-03 | End: 2018-03-03

## 2017-03-03 RX ORDER — HYDROCODONE BITARTRATE AND ACETAMINOPHEN 5; 325 MG/1; MG/1
1 TABLET ORAL EVERY 6 HOURS PRN
Qty: 60 TABLET | Refills: 0 | Status: SHIPPED | OUTPATIENT
Start: 2017-03-03 | End: 2017-03-06 | Stop reason: DRUGHIGH

## 2017-03-03 RX ADMIN — POLYETHYLENE GLYCOL 3350 17 G: 17 POWDER, FOR SOLUTION ORAL at 08:03

## 2017-03-03 RX ADMIN — CELECOXIB 200 MG: 200 CAPSULE ORAL at 08:03

## 2017-03-03 RX ADMIN — SODIUM CHLORIDE, SODIUM LACTATE, POTASSIUM CHLORIDE, AND CALCIUM CHLORIDE: .6; .31; .03; .02 INJECTION, SOLUTION INTRAVENOUS at 12:03

## 2017-03-03 RX ADMIN — HYDROCODONE BITARTRATE AND ACETAMINOPHEN 1 TABLET: 5; 325 TABLET ORAL at 03:03

## 2017-03-03 RX ADMIN — METOPROLOL TARTRATE 50 MG: 50 TABLET ORAL at 08:03

## 2017-03-03 RX ADMIN — LEVOTHYROXINE SODIUM 25 MCG: 25 TABLET ORAL at 09:03

## 2017-03-03 RX ADMIN — COLESEVELAM HYDROCHLORIDE 1875 MG: 625 TABLET, FILM COATED ORAL at 08:03

## 2017-03-03 RX ADMIN — MUPIROCIN 1 G: 20 OINTMENT TOPICAL at 08:03

## 2017-03-03 RX ADMIN — HYDROCODONE BITARTRATE AND ACETAMINOPHEN 1 TABLET: 5; 325 TABLET ORAL at 08:03

## 2017-03-03 RX ADMIN — FAMOTIDINE 20 MG: 20 TABLET, FILM COATED ORAL at 08:03

## 2017-03-03 RX ADMIN — ASPIRIN 81 MG CHEWABLE TABLET 81 MG: 81 TABLET CHEWABLE at 08:03

## 2017-03-03 RX ADMIN — TELMISARTAN 80 MG: 40 TABLET ORAL at 08:03

## 2017-03-03 NOTE — PLAN OF CARE
Problem: Occupational Therapy Goal  Goal: Occupational Therapy Goal  Goals to be met by 4/7/2017:  1. SBA for toileting with RW  2. SBA for LBD with adaptive equipment.   3. SBA for grooming (2 tasks) at sink with RW.   4. Discuss home safety and set up for self-care.   Outcome: Ongoing (interventions implemented as appropriate)  OT evaluation completed and treatment initiated. Pt ambulated to bedside commode with SBA and required min A for clothing management during toileting. Pt washed hands at EOB with wipes and washcloth. Pt performed UBD with Min A for donning [facility] gown as a robe. Pt required Min A and AE for LBD to doff/don socks and don/doff briefs. Pt would continue to benefit from hospital based OT services to improve independence in self-care activities. Recommend Home Health PT/OT at discharge.  DME: [facility], 3-in-1 Commode, hip kit.  .     MANUEL Ruiz  March 3, 2017

## 2017-03-03 NOTE — PROGRESS NOTES
"Nephrology  Progress Note    Admit Date: 3/2/2017   LOS: 1 day     SUBJECTIVE:     Follow-up For:  Unspecified mechanical complication of internal orthopedic device, implant, and graft    Interval History:    Uneventful night.  Walking with therapy at present.  Denies CP/SOB/Calf pain.  Family at bedside.      Review of Systems:  Constitutional: No fever or chills  Respiratory: No cough or shortness of breath  Cardiovascular: No chest pain or palpitations  Gastrointestinal: No nausea or vomiting  Neurological: No confusion or weakness    OBJECTIVE:     Vital Signs Range (Last 24H):  BP (!) 145/65  Pulse 65  Temp 98.2 °F (36.8 °C) (Oral)   Resp 16  Ht 5' 3" (1.6 m)  Wt 56.7 kg (125 lb)  SpO2 (!) 94%  Breastfeeding? No  BMI 22.14 kg/m2    Temp:  [97.5 °F (36.4 °C)-98.2 °F (36.8 °C)]   Pulse:  [62-80]   Resp:  [16]   BP: (135-186)/(63-82)   SpO2:  [91 %-100 %]     I & O (Last 24H):  Intake/Output Summary (Last 24 hours) at 03/03/17 1004  Last data filed at 03/03/17 0700   Gross per 24 hour   Intake             1284 ml   Output             2950 ml   Net            -1666 ml       Physical Exam:  General appearance: Well developed, well nourished  Eyes:  Conjunctivae/corneas clear. PERRL.  Lungs: Normal respiratory effort,   clear to auscultation bilaterally   Heart: Rajesh/Regular rate and rhythm, S1, S2 normal, no murmur, rub or rachel.  Abdomen: Soft, non-tender non-distended; bowel sounds normal; no masses,  no organomegaly  Extremities: No cyanosis or clubbing. No edema.    Skin: Skin color, texture, turgor normal. No rashes or lesions  Neurologic: Normal strength and tone. No focal numbness or weakness   Right Hip: CDI    Laboratory Data:    Recent Labs  Lab 03/03/17  0441   WBC 5.65   RBC 4.03   HGB 11.9*   HCT 36.5*      MCV 91   MCH 29.5   MCHC 32.6       BMP:   Recent Labs  Lab 03/03/17  0441   *   *   K 4.9   CL 97   CO2 30*   BUN 10   CREATININE 0.8   CALCIUM 8.9     Lab Results "   Component Value Date    CALCIUM 8.9 03/03/2017               Medications:  Medication list was reviewed and changes noted under Assessment/Plan.    Diagnostic Results:        ASSESSMENT/PLAN:     1. S/P Right Hip Revision (T84.024F): Per ortho/PT/OT.  2. HTN (I10): Restarted home meds except diuretics with hold parameters.  3. Hypothyroid (E03.9): Cont synthroid.  4. CAD (I25.10): Recent stress test negative. Followed by Dr. Huertas at [facility].  5. Osteoporosis (M81.0): On ergo weekly and calcium supplements. Defer to pcp.  6. Mild Hyponatremia secondary to isotonic solution (E87.1):  DC LR.  Should improve on its own.  Hold restarting diuretics until Monday.   7. DVT prophy: ASA bid         Cleared from Medicine

## 2017-03-03 NOTE — PLAN OF CARE
DC Planning:   Patient originally planned to D/C 3/4/17 but will DC today 3/3/17  [facility] to follow 3/4/17  DME Delivered: RW, 3 in 1 commode, Hip Kit       03/03/17 1103   Final Note   Assessment Type Final Discharge Note   Discharge Disposition Home-Health   Discharge planning education complete? Yes   What phone number can be called within the next 1-3 days to see how you are doing after discharge? [phone]   Discharge plans and expectations educations in teach back method with documentation complete? No   Offered [facility] -- Bedside Delivery? n/a   Discharge/Hospital Encounter Summary to (non-[facility]) PCP n/a   Referral to Outpatient Case Management complete? n/a   Referral to / orders for Home Health Complete? Yes   30 day supply of medicines given at discharge, if documented non-compliance / non-adherence? n/a   Any social issues identified prior to discharge? n/a   Did you assess the readiness or willingness of the family or caregiver to support self management of care? Yes   Right Care Referral Info   Post Acute Recommendation Home-care   Referral Type Home Health   Facility Name [facility]

## 2017-03-03 NOTE — PLAN OF CARE
Problem: Patient Care Overview  Goal: Plan of Care Review  Outcome: Ongoing (interventions implemented as appropriate)  Pt free of trauma, falls, and injury. Pt VSS and afebrile throughout shift. Pt free of skin breakdown. Pt neurovascular checks are intact. Pt dressing is clean, dry, and intact. Pt pain has been moderately controlled by PO pain meds and tolerated well. Pt has ambulated with PT and RW in no distress. Pt has been eating and voiding adequately throughout shift. Pt had call light in reach, bed alarm on, bed brakes on, side rails up x2, bed in low position, TEDs/SCDs on, IS at bedside, and nonskid socks on for duration of shift. Pt given discharge instructions and verbalized understanding. IV removed without complications. Pt transported off unit safely via wheelchair to the [facility] with belongings.

## 2017-03-03 NOTE — PLAN OF CARE
Problem: Patient Care Overview  Goal: Plan of Care Review  Outcome: Ongoing (interventions implemented as appropriate)  Pt currently on 2LNC  . Pt in no distress at this time. Sats  97 %. Will continue to monitor. IS done.

## 2017-03-03 NOTE — PT/OT/SLP EVAL
Occupational Therapy  Evaluation, Treatment and Discharge Note    Layla Griffiths   MRN: [id number]   Admitting Diagnosis: Unspecified mechanical complication of internal orthopedic device, implant, and graft    OT Date of Treatment: 03/03/17   OT Start Time: 0844  OT Stop Time: 0935  OT Total Time (min): 51 min    Billable Minutes:  Evaluation 21  Self Care/Home Management 30    Diagnosis: Unspecified mechanical complication of internal orthopedic device, implant, and graft   S/p R NITESH    Past Medical History:   Diagnosis Date    Anemia     Arthritis     Blood transfusion     Cancer     uterine    Cataract     Coronary artery disease     Degenerative disc disease     GERD (gastroesophageal reflux disease)     Glaucoma     Hyperlipidemia     Hypertension     Osteoporosis     Thyroid disease       Past Surgical History:   Procedure Laterality Date    ADENOIDECTOMY      APPENDECTOMY      BACK SURGERY  2006    Stenosis    BACK SURGERY      multiple procedures, cervical to sacral spine. Screws & titanium rods    BLADDER SUSPENSION  2009    BREAST LUMPECTOMY Left 1965    BREAST SURGERY Left 1966 & 1998    Lumpectomy- Both Benign    COLONOSCOPY  2014?    Dr Camp    EYE SURGERY Bilateral 2014 2014    HAND SURGERY Left 2016    HYSTERECTOMY  1969    JOINT REPLACEMENT      right hip    SPINE SURGERY  2013    Scoliosis    TONSILLECTOMY         Referring physician: VIKASH Bhandari   Date referred to OT: March 3, 2017    General Precautions: Standard, fall  Orthopedic Precautions: RLE weight bearing as tolerated , anterior hip precautions  Braces: N/A    Do you have any cultural, spiritual, Jew conflicts, given your current situation?: None specified     Patient History:  Living Environment  Lives With: spouse  Living Arrangements: house  Home Layout: Able to live on 1st floor  Transportation Available: family or friend will provide  Living Environment Comment:  (pt lives with  in a  1 story house with 2 MIK and a tub shower combo. She reports using DME and AE for ADLs and  assisting with tasks when needed)  Equipment Currently Used at Home: rollator, bath bench    Prior level of function:   Bed Mobility/Transfers: independent  Grooming: independent  Bathing: needs device and assist ( helped with washing hair, used tub transfer bench)  Upper Body Dressing: independent  Lower Body Dressing: needs device and assist ( helped with shoes if needed, uses sock aid)  Toileting: independent  Home Management Skills: needs assist ( assists when needed)  Homemaking Responsibilities: Yes  Driving License: Yes  Mode of Transportation: Friends, Family  Leisure and Hobbies: plays, musicals, grandchildren's sports events, craft fairs     Dominant hand: right    Subjective:  Communicated with nursing prior to session.  Pt reported feeling nausea and hot throughout the session.   Chief Complaint: pain  Patient/Family stated goals: return home    Pain Rating: 10/10  Location - Side: Right  Location - Orientation: generalized  Location: hip  Pain Addressed: Pre-medicate for activity, Reposition, Distraction  Pain Rating Post-Intervention: 3/10     Pt complained of nausea and feeling hot when sitting EOB after using the bedside commode. Vitals taken. Oxygen (NC) reapplied due to low oxygen saturation)  BP: 165/69   Pulse: 72  SpO2: 89% (RA)    Objective:  Patient found with: peripheral IV, oxygen    Cognitive Exam:  Oriented to: Person, Place, Time and Situation  Follows Commands/attention: Follows multistep  commands  Communication: clear/fluent  Memory:  No Deficits noted  Safety awareness/insight to disability: impaired  Coping skills/emotional control: Appropriate to situation    Visual/perceptual:  Intact    Physical Exam:  Postural examination/scapula alignment: No postural abnormalities identified  Skin integrity: Visible skin intact  Edema: None noted     Sensation:    Intact    Upper Extremity Range of Motion:  Right Upper Extremity: WNL  Left Upper Extremity: WNL    Upper Extremity Strength:  Right Upper Extremity: WNL  Left Upper Extremity: WNL   Strength: WNL    Fine motor coordination:   Intact    Gross motor coordination: WFL    Functional Mobility:  Bed Mobility:  Supine to Sit: Stand by Assistance (in hospital bed)    Transfers:  Sit <> Stand Assistance: Contact Guard Assistance  Sit <> Stand Assistive Device: Rolling Walker  Bed <> Chair Transfer Assistance: Stand By Assistance  Bed <> Chair Assistive Device: Rolling Walker  Toilet Transfer Assistance: Stand By Assistance (onto bedside commode)  Toilet Transfer Assistive Device: Rolling Walker    Functional Ambulation: ambulated to bedside commode, around hospital room and to bedside chair    Activities of Daily Living:    UE Dressing Level of Assistance: Independent (don hospital gown as a robe at EOB)  LE Dressing Level of Assistance: Minimum assistance (Min A to doff socks, supervision to estefany sock, Min A to don briefs at EOB, with reacher and sock aid)  Grooming Position: Seated, EOB  Grooming Level of Assistance: Supervision (wash hands with washcloth and wipes)  Toileting Where Assessed: Bedside commode  Toileting Level of Assistance: Minimum assistance (for clothing management)       Balance:   Static Sit: GOOD-: Takes MODERATE challenges from all directions but inconsistently  Dynamic Sit: GOOD: Maintains balance through MODERATE excursions of active trunk movement  Static Stand: FAIR: Maintains without assist but unable to take challenges  Dynamic stand: FAIR: Needs CONTACT GUARD during gait    Therapeutic Activities and Exercises:  Pt education on RW safety, hip kit for LBD, hip precautions, and problem solving for self-care for hip precautions.     AM-PAC 6 CLICK ADL  How much help from another person does this patient currently need?  1 = Unable, Total/Dependent Assistance  2 = A lot, Maximum/Moderate  "Assistance  3 = A little, Minimum/Contact Guard/Supervision  4 = None, Modified [place]/Independent    Putting on and taking off regular lower body clothing? : 3  Bathing (including washing, rinsing, drying)?: 2  Toileting, which includes using toilet, bedpan, or urinal? : 3  Putting on and taking off regular upper body clothing?: 4  Taking care of personal grooming such as brushing teeth?: 4  Eating meals?: 4  Total Score: 20    AM-PAC Raw Score CMS "G-Code Modifier Level of Impairment Assistance   6 % Total / Unable   7 - 9 CM 80 - 100% Maximal Assist   10 - 14 CL 60 - 80% Moderate Assist   15 - 19 CK 40 - 60% Moderate Assist   20 - 22 CJ 20 - 40% Minimal Assist   23 CI 1-20% SBA / CGA   24 CH 0% Independent/ Mod I       Patient left supine with all lines intact and call button in reach    Assessment:  Layla Griffiths is a 71 y.o. female with a medical diagnosis of Unspecified mechanical complication of internal orthopedic device, implant, and graft and presents with s/p RTHA. Pt presents with pain, decreased ROM, weakness, balance, orthopedic precautions, impaired functional mobility.   OT evaluation completed and treatment initiated. Pt ambulated to bedside commode with SBA and required min A for clothing management during toileting. Pt washed hands at EOB with wipes and washcloth. Pt performed UBD with Min A for donning hospital gown as a robe. Pt required Min A and AE for LBD to doff/don socks and don/doff briefs. Pt would continue to benefit from hospital based OT services to improve independence in self-care activities.     Rehab identified problem list/impairments: Rehab identified problem list/impairments:  (Performance Deficits Impairing Function: pain, decreased ROM, weakness, balance, orthopedic precautions, impaired functional mobility)    Rehab potential is good.    Activity tolerance: Good    Discharge recommendations: Discharge Facility/Level Of Care Needs: home health PT, home " health OT     Barriers to discharge: Barriers to Discharge: None    Equipment recommendations: 3-in-1 commode, hip kit, walker, rolling     GOALS:   Occupational Therapy Goals        Problem: Occupational Therapy Goal    Goal Priority Disciplines Outcome Interventions   Occupational Therapy Goal     OT, PT/OT Ongoing (interventions implemented as appropriate)    Description:  Goals to be met by 4/7/2017:  1. SBA for toileting with RW  2. SBA for LBD with adaptive equipment.   3. SBA for grooming (2 tasks) at sink with RW.   4. Discuss home safety and set up for self-care.               PLAN:  Patient to be seen daily to address the above listed problems via self-care/home management, therapeutic activities, therapeutic exercises  Plan of Care expires: 04/07/17  Plan of Care reviewed with: patient, friend         MANUEL Ruiz  03/03/2017

## 2017-03-03 NOTE — PROGRESS NOTES
Physical Therapy Discharge Summary    Layla Griffiths  MRN: [id number]   Unspecified mechanical complication of internal orthopedic device, implant, and graft   Patient Discharged from acute Physical Therapy on 3/3/2017  Please refer to prior PT noted date on 3/3/2017 for functional status.     Assessment:   Patient has not met goals.  GOALS:   Physical Therapy Goals        Problem: Physical Therapy Goal    Goal Priority Disciplines Outcome Goal Variances Interventions   Physical Therapy Goal     PT/OT, PT Ongoing (interventions implemented as appropriate)     Description:  Goals to be met by: 3/31/17     Patient will increase functional independence with mobility by performin. Supine to sit with modified independence.   2. Sit to supine with modified independence.   3. Sit<>stand transfer with modified independence using rolling walker.   4. Gait x 150 feet with modified independence using rolling walker.   5. Ascend/descend 2 stairs with no handrails with CGA with or without AD.            Reasons for Discontinuation of Therapy Services  Transfer to alternate level of care.      Plan:  Patient Discharged to: Home with Home Health Service.

## 2017-03-03 NOTE — PROGRESS NOTES
Urvashi at [facility], approved the removal of bedside commode, hip kit and rolling walker from DME closet.  SW delivered walker, BSC and hip kit to pt's room and spouse signed for delivery.  Pt was aware that she will be billed for hip kit.

## 2017-03-03 NOTE — DISCHARGE SUMMARY
[facility]  Discharge Summary      Admit Date: 3/2/2017    Discharge Date and Time: 3/4  Attending Physician: David Bhandari MD     Reason for Admission: failed hip    Procedures Performed: Procedure(s) (LRB):  REVISION-ARTHROPLASTY-HIP-TOTAL (Right)    Hospital Course (synopsis of major diagnoses, care, treatment, and services provided during the course of the hospital stay): The above patient underwent the above procedure.  Post operative the patient received pain management and pt / ot. The patient progressed well and will be discharged--see orders.     Consults: med    Significant Diagnostic Studies: Labs:   CBC   Recent Labs  Lab 03/03/17  0441   WBC 5.65   HGB 11.9*   HCT 36.5*          Final Diagnoses:    Principal Problem: Unspecified mechanical complication of internal orthopedic device, implant, and graft   Secondary Diagnoses:   Active Hospital Problems    Diagnosis  POA   No active problems to display.      Resolved Hospital Problems    Diagnosis Date Resolved POA    *Unspecified mechanical complication of internal orthopedic device, implant, and graft [T84.498A] 03/03/2017 Yes       Discharged Condition: good    Disposition: Home-Health Care [facility]    Follow Up/Patient Instructions:     Medications:  Reconciled Home Medications:   Current Discharge Medication List      START taking these medications    Details   aspirin 81 MG Chew Take 1 tablet (81 mg total) by mouth 2 (two) times daily.  Refills: 0      hydrocodone-acetaminophen 5-325mg (NORCO) 5-325 mg per tablet Take 1 tablet by mouth every 6 (six) hours as needed.  Qty: 60 tablet, Refills: 0         CONTINUE these medications which have NOT CHANGED    Details   amlodipine (NORVASC) 2.5 MG tablet TK 1 T PO  QHS  Refills: 3      BIOTIN ORAL Take by mouth.      calcium citrate 1,000 mg Tab Take 1 tablet by mouth once daily.        colesevelam (WELCHOL) 625 mg tablet Take 1,875 mg by mouth 2 (two) times daily with meals.    "     DECARA 50,000 unit capsule TK 1 C PO Q WEEK  Refills: 4      fluticasone-vilanterol (BREO ELLIPTA) 200-25 mcg/dose DsDv diskus inhaler Inhale 1 puff into the lungs once daily. Controller      FOLIC ACID/MULTIVIT-MIN/LUTEIN (CENTRUM SILVER ORAL) Take by mouth.      Lactobacillus rhamnosus GG (CULTURELLE) 10 billion cell capsule Take 1 capsule by mouth once daily.      levothyroxine (SYNTHROID) 25 MCG tablet Take 25 mcg by mouth once daily.      metoprolol tartrate (LOPRESSOR) 50 MG tablet Take 1 tablet by mouth Twice daily.      ranitidine (ZANTAC) 150 MG tablet Take 150 mg by mouth 2 (two) times daily.      spironolactone-hydrochlorothiazide 25-25mg (ALDACTAZIDE) 25-25 mg Tab Take 1 tablet by mouth daily as needed (as needed at bedtime).      telmisartan (MICARDIS) 80 MG Tab TK 1 T PO QD  Refills: 3      travoprost, benzalkonium, (TRAVATAN) 0.004 % ophthalmic solution Place 1 drop into both eyes every evening.        bisacodyl (DULCOLAX) 5 mg EC tablet Take 5 mg by mouth daily as needed for Constipation.         STOP taking these medications       aspirin (ECOTRIN) 81 MG EC tablet Comments:   Reason for Stopping:               Discharge Procedure Orders  HIP KIT FOR HOME USE   Order Specific Question Answer Comments   Height: 5' 3" (1.6 m)    Weight: 56.7 kg (125 lb)    Does patient have medical equipment at home? bath bench    Does patient have medical equipment at home? walker, standard    Does patient have medical equipment at home? cane, quad    Length of need (1-99 months): 99    Type: [place] Hip Kit      WALKER FOR HOME USE   Order Specific Question Answer Comments   Type of Walker: Adult (5'4"-6'6")    With wheels? Yes    Height: 5' 3" (1.6 m)    Weight: 56.7 kg (125 lb)    Length of need (1-99 months): 99    Does patient have medical equipment at home? bath bench    Does patient have medical equipment at home? walker, standard    Does patient have medical equipment at home? cane, quad    Please " "check all that apply: Patient's condition impairs ambulation.    Please check all that apply: Patient needs help to get in and out of chair.    Please check all that apply: Walker will be used for gait training.      COMMODE FOR HOME USE   Order Specific Question Answer Comments   Type: Standard    Height: 5' 3" (1.6 m)    Weight: 56.7 kg (125 lb)    Does patient have medical equipment at home? bath bench    Does patient have medical equipment at home? walker, standard    Does patient have medical equipment at home? cane, quad    Length of need (1-99 months): 99      Diet general     Activity as tolerated     Sponge bath only until clinic visit       Follow-up Information     Please follow up.    Why:  AS SCHEDULED         "

## 2017-03-03 NOTE — NURSING
Hourly rounding performed. Pain controlled with prescribed medication. Pt VSS and afebrile, free of falls, trauma. Injury, and skin break downs. Pt denies SOB, CP, & N/V. Pt in low locked bed, call light in reach.

## 2017-03-03 NOTE — PROGRESS NOTES
Assessment/Plan:    Status Post right Total Hip Arthroplasty.rev   Doing well postoperatively.     Continues current post-op course    Dc am     LOS: 1 day     Subjective:  Post-Operative Day: 1 Status Post right Total Hip Arthroplasty rev  Systemic or Specific Complaints:No Complaints    Objective:  Vital signs in last 24 hours:  Temp:  [97.5 °F (36.4 °C)-98.2 °F (36.8 °C)] 98.2 °F (36.8 °C)  Pulse:  [62-80] 65  Resp:  [14-16] 16  SpO2:  [91 %-100 %] 94 %  BP: (135-186)/(58-82) 145/65      General: alert, appears stated age and cooperative   Wound: Wound clean and dry no evidence of infection.   Motion: Painful range of Motion   DVT Exam: No evidence of DVT seen on physical exam.     Data Review  CBC:   Lab Results   Component Value Date    WBC 5.65 03/03/2017    RBC 4.03 03/03/2017    HGB 11.9 (L) 03/03/2017    HCT 36.5 (L) 03/03/2017     03/03/2017

## 2017-03-03 NOTE — NURSING
Discharge instructions reviewed with pt and spouse at length and verbalized 100% understanding.Extra pair knee high louise hose sent home with pt

## 2017-03-03 NOTE — NURSING
No significant events overnight. Remains free from fall, injury, and skin breakdown. Voiding via [place] to gravity; mafyield pulled in AM per order. VSS on 1L/O2 throughout the night. Pain well controlled with PO meds. Incision/dressing CDI. Plan of care reviewed with patient and all questions answered. Bed low, locked w/ bed alarm on. Call light within reach. Purposeful rounding performed. Resting comfortably in bed, friend is at the bedside, no other complaints at this time.

## 2017-03-03 NOTE — PT/OT/SLP PROGRESS
Physical Therapy  Treatment    Layla Griffiths   MRN: [id number]   Admitting Diagnosis: Unspecified mechanical complication of internal orthopedic device, implant, and graft    PT Received On: 17  PT Start Time: 1330     PT Stop Time: 1342    PT Total Time (min): 12 min       Billable Minutes:  Gait [id number]    Treatment Type: Treatment  PT/PTA: PT           General Precautions: Standard, fall  Orthopedic Precautions: RLE weight bearing as tolerated     Do you have any cultural, spiritual, Sikhism conflicts, given your current situation?: None specified    Subjective:  Communicated with RN prior to session.    Pain Ratin/10  Location - Side: Right  Location - Orientation: generalized  Location: thigh  Pain Addressed: Reposition, Distraction  Pain Rating Post-Intervention: 6/10    Objective:   Patient found with: peripheral IV    Functional Mobility:  Transfers:  Sit <> Stand Assistance: Modified Independent  Sit <> Stand Assistive Device: Rolling Walker    Gait:   Gait Distance: x 45'  Assistance 1: Contact Guard Assistance  Gait Assistive Device: Rolling walker  Gait Pattern: reciprocal  Gait Deviation(s): decreased marielena, increased time in double stance, decreased velocity of limb motion, decreased step length, decreased stride length, decreased toe-to-floor clearance, decreased weight-shifting ability  Pt required intermittent verbal cues for increased step length, walker management, and heel-toe gait pattern     Balance:   Static Sit: GOOD: Takes MODERATE challenges from all directions  Dynamic Sit: GOOD: Maintains balance through MODERATE excursions of active trunk movement  Static Stand: GOOD: Takes MODERATE challenges from all directions  Dynamic stand: GOOD: Needs SUPERVISION only during gait and able to self right with moderate      AM-PAC 6 CLICK MOBILITY  How much help from another person does this patient currently need?   1 = Unable, Total/Dependent Assistance  2 = A lot,  Maximum/Moderate Assistance  3 = A little, Minimum/Contact Guard/Supervision  4 = None, Modified [place]/Independent    Turning over in bed (including adjusting bedclothes, sheets and blankets)?: 3  Sitting down on and standing up from a chair with arms (e.g., wheelchair, bedside commode, etc.): 3  Moving from lying on back to sitting on the side of the bed?: 3  Moving to and from a bed to a chair (including a wheelchair)?: 3  Need to walk in hospital room?: 3  Climbing 3-5 steps with a railing?: 3  Total Score: 18    AM-PAC Raw Score CMS G-Code Modifier Level of Impairment Assistance   6 % Total / Unable   7 - 9 CM 80 - 100% Maximal Assist   10 - 14 CL 60 - 80% Moderate Assist   15 - 19 CK 40 - 60% Moderate Assist   20 - 22 CJ 20 - 40% Minimal Assist   23 CI 1-20% SBA / CGA   24 CH 0% Independent/ Mod I     Patient left up in chair with all lines intact, call button in reach, RN notified and  present.    Assessment:  Pt tolerated p.m. Session well. Pt would benefit from continued skilled PT to maximize independence and safety with functional mobility.    Rehab identified problem list/impairments: Rehab identified problem list/impairments: weakness, impaired endurance, impaired self care skills, impaired functional mobilty, pain, decreased safety awareness, decreased lower extremity function, impaired balance, gait instability    Rehab potential is good.    Activity tolerance: Good    Discharge recommendations: Discharge Facility/Level Of Care Needs:  (TBD pending progress)     Barriers to discharge: Barriers to Discharge: None    Equipment recommendations: Equipment Needed After Discharge: 3-in-1 commode     GOALS:   Physical Therapy Goals        Problem: Physical Therapy Goal    Goal Priority Disciplines Outcome Goal Variances Interventions   Physical Therapy Goal     PT/OT, PT Ongoing (interventions implemented as appropriate)     Description:  Goals to be met by: 3/31/17     Patient will  increase functional independence with mobility by performin. Supine to sit with modified independence.   2. Sit to supine with modified independence.   3. Sit<>stand transfer with modified independence using rolling walker.   4. Gait x 150 feet with modified independence using rolling walker.   5. Ascend/descend 2 stairs with no handrails with CGA with or without AD.            PLAN:    Patient to be seen BID  to address the above listed problems via gait training, therapeutic activities, therapeutic exercises, neuromuscular re-education  Plan of Care expires: 17  Plan of Care reviewed with: patient, spouse    Meghann Salgado, PT  2017

## 2017-03-03 NOTE — PLAN OF CARE
Problem: Physical Therapy Goal  Goal: Physical Therapy Goal  Goals to be met by: 3/31/17     Patient will increase functional independence with mobility by performin. Supine to sit with modified independence.   2. Sit to supine with modified independence.   3. Sit<>stand transfer with modified independence using rolling walker.   4. Gait x 150 feet with modified independence using rolling walker.   5. Ascend/descend 2 stairs with no handrails with CGA with or without AD.   Outcome: Ongoing (interventions implemented as appropriate)  Pt tolerated treatment session well. Please see progress note for details, POC, and recommendations.

## 2017-03-03 NOTE — PT/OT/SLP PROGRESS
"Physical Therapy  Treatment    Layla Griffiths   MRN: [id number]   Admitting Diagnosis: Unspecified mechanical complication of internal orthopedic device, implant, and graft    PT Received On: 03/03/17  PT Start Time: 0944     PT Stop Time: 1019    PT Total Time (min): 35 min       Billable Minutes:  Gait [id number] and Therapeutic Activity 10    Treatment Type: Treatment  PT/PTA: PT           General Precautions: Standard, fall  Orthopedic Precautions: RLE weight bearing as tolerated     Do you have any cultural, spiritual, Religion conflicts, given your current situation?: None specified    Subjective:  Communicated with RN prior to session.    Pain Rating: 3/10  Location - Side: Right  Location - Orientation: generalized  Location: thigh  Pain Addressed: Pre-medicate for activity, Reposition, Distraction  Pain Rating Post-Intervention: 6/10    Objective:   Patient found with: peripheral IV    Functional Mobility:  Transfers:  Sit <> Stand Assistance: Contact Guard Assistance (x 2 from bedside chair; pt demonstrated good safety awareness and hand placement)  Sit <> Stand Assistive Device: Rolling Walker    Gait:   Gait Distance: 1 x 25'; 1 x 50'; pt required verbal cues for increased step length, heel-toe gait pattern, and safety  Assistance 1: Contact Guard Assistance  Gait Assistive Device: Rolling walker  Gait Pattern: reciprocal  Gait Deviation(s): decreased marielena, increased time in double stance, decreased step length, decreased velocity of limb motion, decreased toe-to-floor clearance, decreased weight-shifting ability, decreased stride length    Stairs:  Pt ascended/descend 4" curb step with Rolling Walker with bilateral with Contact Guard Assistance.  x 2 trials  Pt required verbal and visual cues for safety and technique     Balance:   Static Sit: GOOD: Takes MODERATE challenges from all directions  Dynamic Sit: GOOD: Maintains balance through MODERATE excursions of active trunk movement  Static " Stand: GOOD-: Takes MODERATE challenges from all directions inconsistently  Dynamic stand: GOOD-: Needs SUPERVISION only during gait and able to self right with moderate      AM-PAC 6 CLICK MOBILITY  How much help from another person does this patient currently need?   1 = Unable, Total/Dependent Assistance  2 = A lot, Maximum/Moderate Assistance  3 = A little, Minimum/Contact Guard/Supervision  4 = None, Modified [place]/Independent    Turning over in bed (including adjusting bedclothes, sheets and blankets)?: 3  Sitting down on and standing up from a chair with arms (e.g., wheelchair, bedside commode, etc.): 3  Moving from lying on back to sitting on the side of the bed?: 3  Moving to and from a bed to a chair (including a wheelchair)?: 3  Need to walk in hospital room?: 3  Climbing 3-5 steps with a railing?: 3  Total Score: 18    AM-PAC Raw Score CMS G-Code Modifier Level of Impairment Assistance   6 % Total / Unable   7 - 9 CM 80 - 100% Maximal Assist   10 - 14 CL 60 - 80% Moderate Assist   15 - 19 CK 40 - 60% Moderate Assist   20 - 22 CJ 20 - 40% Minimal Assist   23 CI 1-20% SBA / CGA   24 CH 0% Independent/ Mod I     Patient left up in chair with all lines intact, call button in reach, R notified and friend and  present.    Assessment:  Pt progressed with with therapy this session towards goals. Does not tolerated gait distance > 50' secondary to reports of weakness. Pt would benefit from continued skilled PT to maximize independence and safety with functional mobility.    Rehab identified problem list/impairments: Rehab identified problem list/impairments: weakness, impaired endurance, impaired self care skills, impaired functional mobilty, pain, decreased safety awareness, decreased lower extremity function, impaired balance, gait instability    Rehab potential is good.    Activity tolerance: Good    Discharge recommendations: Discharge Facility/Level Of Care Needs:  (TBD pending  progress)     Barriers to discharge: Barriers to Discharge: None    Equipment recommendations: Equipment Needed After Discharge: 3-in-1 commode     GOALS:   Physical Therapy Goals        Problem: Physical Therapy Goal    Goal Priority Disciplines Outcome Goal Variances Interventions   Physical Therapy Goal     PT/OT, PT Ongoing (interventions implemented as appropriate)     Description:  Goals to be met by: 3/31/17     Patient will increase functional independence with mobility by performin. Supine to sit with modified independence.   2. Sit to supine with modified independence.   3. Sit<>stand transfer with modified independence using rolling walker.   4. Gait x 150 feet with modified independence using rolling walker.   5. Ascend/descend 2 stairs with no handrails with CGA with or without AD.            PLAN:    Patient to be seen BID  to address the above listed problems via gait training, therapeutic activities, therapeutic exercises, neuromuscular re-education  Plan of Care expires: 17  Plan of Care reviewed with: patient, friend     Meghann Salgado, PT  2017

## 2017-03-03 NOTE — DISCHARGE SUMMARY
[facility]  Discharge Summary      Admit Date: 3/2/2017    Discharge Date and Time:  03/03/2017 10:35 AM    Attending Physician: David Bhandari MD     Reason for Admission: failed hip    Procedures Performed: Procedure(s) (LRB):  REVISION-ARTHROPLASTY-HIP-TOTAL (Right)    Hospital Course (synopsis of major diagnoses, care, treatment, and services provided during the course of the hospital stay): The above patient underwent the above procedure.  Post operative the patient received pain management and pt / ot. The patient progressed well and will be discharged--see orders.     Consults: med    Significant Diagnostic Studies: Labs:   CBC   Recent Labs  Lab 03/03/17  0441   WBC 5.65   HGB 11.9*   HCT 36.5*          Final Diagnoses:    Principal Problem: Unspecified mechanical complication of internal orthopedic device, implant, and graft   Secondary Diagnoses:   Active Hospital Problems    Diagnosis  POA   No active problems to display.      Resolved Hospital Problems    Diagnosis Date Resolved POA    *Unspecified mechanical complication of internal orthopedic device, implant, and graft [T84.498A] 03/03/2017 Yes       Discharged Condition: good    Disposition: Home-Health Care [facility]    Follow Up/Patient Instructions:     Medications:  Reconciled Home Medications:   Current Discharge Medication List      START taking these medications    Details   aspirin 81 MG Chew Take 1 tablet (81 mg total) by mouth 2 (two) times daily.  Refills: 0      hydrocodone-acetaminophen 5-325mg (NORCO) 5-325 mg per tablet Take 1 tablet by mouth every 6 (six) hours as needed.  Qty: 60 tablet, Refills: 0         CONTINUE these medications which have NOT CHANGED    Details   amlodipine (NORVASC) 2.5 MG tablet TK 1 T PO  QHS  Refills: 3      BIOTIN ORAL Take by mouth.      calcium citrate 1,000 mg Tab Take 1 tablet by mouth once daily.        colesevelam (WELCHOL) 625 mg tablet Take 1,875 mg by mouth 2 (two) times  "daily with meals.        DECARA 50,000 unit capsule TK 1 C PO Q WEEK  Refills: 4      fluticasone-vilanterol (BREO ELLIPTA) 200-25 mcg/dose DsDv diskus inhaler Inhale 1 puff into the lungs once daily. Controller      FOLIC ACID/MULTIVIT-MIN/LUTEIN (CENTRUM SILVER ORAL) Take by mouth.      Lactobacillus rhamnosus GG (CULTURELLE) 10 billion cell capsule Take 1 capsule by mouth once daily.      levothyroxine (SYNTHROID) 25 MCG tablet Take 25 mcg by mouth once daily.      metoprolol tartrate (LOPRESSOR) 50 MG tablet Take 1 tablet by mouth Twice daily.      ranitidine (ZANTAC) 150 MG tablet Take 150 mg by mouth 2 (two) times daily.      spironolactone-hydrochlorothiazide 25-25mg (ALDACTAZIDE) 25-25 mg Tab Take 1 tablet by mouth daily as needed (as needed at bedtime).      telmisartan (MICARDIS) 80 MG Tab TK 1 T PO QD  Refills: 3      travoprost, benzalkonium, (TRAVATAN) 0.004 % ophthalmic solution Place 1 drop into both eyes every evening.        bisacodyl (DULCOLAX) 5 mg EC tablet Take 5 mg by mouth daily as needed for Constipation.         STOP taking these medications       aspirin (ECOTRIN) 81 MG EC tablet Comments:   Reason for Stopping:               Discharge Procedure Orders  HIP KIT FOR HOME USE   Order Specific Question Answer Comments   Height: 5' 3" (1.6 m)    Weight: 56.7 kg (125 lb)    Does patient have medical equipment at home? bath bench    Does patient have medical equipment at home? walker, standard    Does patient have medical equipment at home? cane, quad    Length of need (1-99 months): 99    Type: [place] Hip Kit      WALKER FOR HOME USE   Order Specific Question Answer Comments   Type of Walker: Adult (5'4"-6'6")    With wheels? Yes    Height: 5' 3" (1.6 m)    Weight: 56.7 kg (125 lb)    Length of need (1-99 months): 99    Does patient have medical equipment at home? bath bench    Does patient have medical equipment at home? walker, standard    Does patient have medical equipment at home? kandi" "quad    Please check all that apply: Patient's condition impairs ambulation.    Please check all that apply: Patient needs help to get in and out of chair.    Please check all that apply: Walker will be used for gait training.      COMMODE FOR HOME USE   Order Specific Question Answer Comments   Type: Standard    Height: 5' 3" (1.6 m)    Weight: 56.7 kg (125 lb)    Does patient have medical equipment at home? bath bench    Does patient have medical equipment at home? walker, standard    Does patient have medical equipment at home? cane, quad    Length of need (1-99 months): 99      Diet general     Diet general     Activity as tolerated     Sponge bath only until clinic visit     Activity as tolerated     Sponge bath only until clinic visit       Follow-up Information     Please follow up.    Why:  AS SCHEDULED         Please follow up.    Why:  AS SCHEDULED         "

## 2017-03-06 ENCOUNTER — PATIENT OUTREACH (OUTPATIENT)
Dept: ADMINISTRATIVE | Facility: CLINIC | Age: 72
End: 2017-03-06
Payer: MEDICARE

## 2017-03-06 RX ORDER — HYDROCODONE BITARTRATE AND ACETAMINOPHEN 7.5; 325 MG/1; MG/1
1 TABLET ORAL EVERY 6 HOURS PRN
COMMUNITY

## 2017-03-06 NOTE — PATIENT INSTRUCTIONS
Discharge Instructions for Total Hip Replacement Surgery   You had a hip replacement surgery. This means your natural hip was replaced with an artificial joint (prosthesis). You may be recovering at home or in a rehabilitation facility. Either way, you must take care of your new hip. Do this by moving and sitting the way you were taught in the hospital. Also, be sure to see your doctor for follow-up visits, and return to activity slowly. Because a total hip replacement is major surgery, it will be a few months before you can move comfortably.   Home care   Take your pain medication exactly as directed.   Dont drive until your doctor says its okay. And never drive while taking opioid pain medication.   Wear the support stockings you were given in the hospital. Wear them for 24 hours a day for 3 week(s).   To relieve discomfort at night, get up and move around.   Tell all your healthcare providers--including your dentist--about your artificial joint before any procedure. You will likely need to take antibiotics before dental work and other medical procedures to reduce the risk of infection.   Arrange to have your staples removed 2 week(s) after surgery. The staples were used to close the skin incision.  Incision care   Check your incision daily for redness, swelling, tenderness, or drainage.   Avoid infection by washing your hands often. If an infection occurs, it will need to be treated immediately. So call your doctor right away if you think you may have an infection. Symptoms include a fever or an incision that leaks white, green, or yellow fluid.   Avoid soaking your incision in water (no hot tubs, bathtubs, swimming pools) until your doctor says its okay.   Wait 5-7 days after your surgery to begin showering. Then shower as needed. Carefully wash your incision with soap and water. Gently pat it dry. Dont rub the incision, or apply creams or lotions to it. And to avoid falling when showering, sit on a shower  stool.  Sitting and sleeping   Dont sit for more than 30-45 minutes at a time.   Use chairs with arms, and sit with your knees slightly lower than your hips. Dont sit on low or sagging chairs or couches.   Dont lean forward while sitting.   Dont cross your legs.   Keep your feet flat on the floor. Dont turn your foot or leg inward. This stresses your hip joint.   Use an elevated toilet seat for 6 weeks after surgery.   Ask your healthcare provider if its okay to sleep on your stomach or on the side that has the new hip. Use pillows between your legs when sleeping on your back or on your side.   Sit on a firm cushion when you ride in a car and avoid sitting too low. Try not to bend your hip too much when getting in and out of the car.  Moving safely   Dont bend at the hip when you bend over. Don't bend at the waist to put on socks and shoes. And avoid picking up items from the floor.   Use a cane, crutches, a walker, or handrails until your balance, flexibility, and strength improve. And remember to ask for help from others when you need it.   Free up your hands so that you can use them to keep balance. Use a ac pack, apron, or pockets to carry things.   Follow your doctors orders regarding how much weight to put on the affected leg.   Walk often and do prescribed exercises as instructed.   Arrange your household to keep the items you need within reach.   Remove electrical cords, throw rugs, and anything else that may cause you to fall.   Use nonslip bath mats, grab bars, an elevated toilet seat, and a shower chair in your bathroom.  Follow-up   Make a follow-up appointment as directed by our staff.     © 3578-0028 [employer]. [address] [place] [address], [place] [address]. All rights reserved. This information is not intended as a substitute for professional medical care. Always follow your healthcare professional's instructions.

## 2017-03-07 LAB — BACTERIA SPEC AEROBE CULT: NO GROWTH

## 2017-03-09 LAB — BACTERIA SPEC ANAEROBE CULT: NORMAL

## 2021-05-09 ENCOUNTER — OFFICE VISIT (OUTPATIENT)
Dept: URGENT CARE | Facility: CLINIC | Age: 76
End: 2021-05-09
Payer: MEDICARE

## 2021-05-09 VITALS
WEIGHT: 124 LBS | RESPIRATION RATE: 14 BRPM | HEART RATE: 83 BPM | DIASTOLIC BLOOD PRESSURE: 77 MMHG | SYSTOLIC BLOOD PRESSURE: 184 MMHG | TEMPERATURE: 98 F | HEIGHT: 63 IN | BODY MASS INDEX: 21.97 KG/M2 | OXYGEN SATURATION: 94 %

## 2021-05-09 DIAGNOSIS — N30.01 ACUTE CYSTITIS WITH HEMATURIA: Primary | ICD-10-CM

## 2021-05-09 DIAGNOSIS — N39.41 URGE INCONTINENCE OF URINE: ICD-10-CM

## 2021-05-09 DIAGNOSIS — R39.9 UTI SYMPTOMS: ICD-10-CM

## 2021-05-09 DIAGNOSIS — I10 ESSENTIAL HYPERTENSION: ICD-10-CM

## 2021-05-09 LAB
BILIRUB UR QL STRIP: NEGATIVE
GLUCOSE UR QL STRIP: NEGATIVE
KETONES UR QL STRIP: NEGATIVE
LEUKOCYTE ESTERASE UR QL STRIP: POSITIVE
PH, POC UA: 6.5 (ref 5–8)
POC BLOOD, URINE: POSITIVE
POC NITRATES, URINE: NEGATIVE
PROT UR QL STRIP: POSITIVE
SP GR UR STRIP: 1.02 (ref 1–1.03)
UROBILINOGEN UR STRIP-ACNC: ABNORMAL (ref 0.1–1.1)

## 2021-05-09 PROCEDURE — 99203 PR OFFICE/OUTPT VISIT, NEW, LEVL III, 30-44 MIN: ICD-10-PCS | Mod: 25,S$GLB,, | Performed by: PHYSICIAN ASSISTANT

## 2021-05-09 PROCEDURE — 87186 SC STD MICRODIL/AGAR DIL: CPT | Performed by: PHYSICIAN ASSISTANT

## 2021-05-09 PROCEDURE — 99203 OFFICE O/P NEW LOW 30 MIN: CPT | Mod: 25,S$GLB,, | Performed by: PHYSICIAN ASSISTANT

## 2021-05-09 PROCEDURE — 87088 URINE BACTERIA CULTURE: CPT | Performed by: PHYSICIAN ASSISTANT

## 2021-05-09 PROCEDURE — 1157F PR ADVANCE CARE PLAN OR EQUIV PRESENT IN MEDICAL RECORD: ICD-10-PCS | Mod: S$GLB,,, | Performed by: PHYSICIAN ASSISTANT

## 2021-05-09 PROCEDURE — 87086 URINE CULTURE/COLONY COUNT: CPT | Performed by: PHYSICIAN ASSISTANT

## 2021-05-09 PROCEDURE — 81003 POCT URINALYSIS, DIPSTICK, AUTOMATED, W/O SCOPE: ICD-10-PCS | Mod: QW,S$GLB,, | Performed by: PHYSICIAN ASSISTANT

## 2021-05-09 PROCEDURE — 81003 URINALYSIS AUTO W/O SCOPE: CPT | Mod: QW,S$GLB,, | Performed by: PHYSICIAN ASSISTANT

## 2021-05-09 PROCEDURE — 87077 CULTURE AEROBIC IDENTIFY: CPT | Performed by: PHYSICIAN ASSISTANT

## 2021-05-09 PROCEDURE — 1157F ADVNC CARE PLAN IN RCRD: CPT | Mod: S$GLB,,, | Performed by: PHYSICIAN ASSISTANT

## 2021-05-09 RX ORDER — PRAVASTATIN SODIUM 20 MG/1
20 TABLET ORAL
COMMUNITY
Start: 2020-06-30

## 2021-05-09 RX ORDER — IRBESARTAN 75 MG/1
75 TABLET ORAL
COMMUNITY
Start: 2021-04-03

## 2021-05-09 RX ORDER — METOPROLOL SUCCINATE 50 MG/1
1 TABLET, EXTENDED RELEASE ORAL
COMMUNITY

## 2021-05-09 RX ORDER — NITROFURANTOIN 25; 75 MG/1; MG/1
100 CAPSULE ORAL 2 TIMES DAILY
Qty: 10 CAPSULE | Refills: 0 | Status: SHIPPED | OUTPATIENT
Start: 2021-05-09 | End: 2021-05-14

## 2021-05-12 LAB — BACTERIA UR CULT: ABNORMAL

## 2021-05-13 ENCOUNTER — TELEPHONE (OUTPATIENT)
Dept: URGENT CARE | Facility: CLINIC | Age: 76
End: 2021-05-13

## 2022-11-23 ENCOUNTER — EXTERNAL HOME HEALTH (OUTPATIENT)
Dept: HOME HEALTH SERVICES | Facility: HOSPITAL | Age: 77
End: 2022-11-23

## 2022-12-08 ENCOUNTER — DOCUMENT SCAN (OUTPATIENT)
Dept: HOME HEALTH SERVICES | Facility: HOSPITAL | Age: 77
End: 2022-12-08
Payer: MEDICARE

## (undated) DEVICE — SEE MEDLINE ITEM 153151

## (undated) DEVICE — COLLECTOR SPECIMEN ANAEROBIC

## (undated) DEVICE — DRAPE HIP TIBURON 87X115X134

## (undated) DEVICE — ELECTRODE REM PLYHSV RETURN 9

## (undated) DEVICE — NDL 18GA X1 1/2 REG BEVEL

## (undated) DEVICE — GAUZE SPONGE 4X4 12PLY

## (undated) DEVICE — DRAPE INCISE IOBAN 2 23X17IN

## (undated) DEVICE — SUT VICRYL+ 1 CTX 18IN VIOL

## (undated) DEVICE — PILLOW SMALL ABDUCTION

## (undated) DEVICE — Device

## (undated) DEVICE — DRAPE SURG W/TWL 17 5/8X23

## (undated) DEVICE — PAD ABD 8X10 STERILE

## (undated) DEVICE — COVER BACK TABLE 72X21

## (undated) DEVICE — SYR 50CC LL

## (undated) DEVICE — GLOVE BIOGEL SKINSENSE PI 8.5

## (undated) DEVICE — HOOD T-5 TEAR AWAY STERILE

## (undated) DEVICE — DRAPE STERI U-SHAPED 47X51IN

## (undated) DEVICE — APPLICATOR CHLORAPREP ORN 26ML

## (undated) DEVICE — SOL 9P NACL IRR PIC IL

## (undated) DEVICE — SUT VICRYL 2-0 8-18 CP-2

## (undated) DEVICE — UNDERGLOVE BIOGEL PI SZ 6.5 LF

## (undated) DEVICE — DRESSING N ADH OIL EMUL 3X8

## (undated) DEVICE — SYR ONLY LUER LOCK 20CC

## (undated) DEVICE — TRAY FOLEY 16FR INFECTION CONT

## (undated) DEVICE — GLOVE BIOGEL SKINSENSE PI 6.5

## (undated) DEVICE — SEE MEDLINE ITEM 157117

## (undated) DEVICE — STAPLER SKIN ROTATING HEAD